# Patient Record
Sex: FEMALE | Race: BLACK OR AFRICAN AMERICAN | NOT HISPANIC OR LATINO | Employment: FULL TIME | URBAN - NONMETROPOLITAN AREA
[De-identification: names, ages, dates, MRNs, and addresses within clinical notes are randomized per-mention and may not be internally consistent; named-entity substitution may affect disease eponyms.]

---

## 2020-06-23 ENCOUNTER — HISTORICAL (OUTPATIENT)
Dept: ADMINISTRATIVE | Facility: HOSPITAL | Age: 40
End: 2020-06-23

## 2021-02-17 ENCOUNTER — TELEPHONE (OUTPATIENT)
Dept: OBSTETRICS AND GYNECOLOGY | Facility: CLINIC | Age: 41
End: 2021-02-17

## 2021-02-17 DIAGNOSIS — Z12.31 SCREENING MAMMOGRAM, ENCOUNTER FOR: Primary | ICD-10-CM

## 2021-02-19 ENCOUNTER — HOSPITAL ENCOUNTER (OUTPATIENT)
Dept: RADIOLOGY | Facility: HOSPITAL | Age: 41
Discharge: HOME | End: 2021-02-19
Attending: OBSTETRICS & GYNECOLOGY
Payer: MEDICAID

## 2021-02-19 VITALS — WEIGHT: 214 LBS | HEIGHT: 65 IN | BODY MASS INDEX: 35.65 KG/M2

## 2021-02-19 DIAGNOSIS — Z12.31 SCREENING MAMMOGRAM, ENCOUNTER FOR: ICD-10-CM

## 2021-02-19 PROCEDURE — 77067 SCR MAMMO BI INCL CAD: CPT | Mod: TC

## 2021-02-22 DIAGNOSIS — R92.8 ABNORMAL MAMMOGRAM: Primary | ICD-10-CM

## 2021-02-23 ENCOUNTER — HOSPITAL ENCOUNTER (OUTPATIENT)
Dept: RADIOLOGY | Facility: HOSPITAL | Age: 41
Discharge: HOME | End: 2021-02-23
Attending: OBSTETRICS & GYNECOLOGY
Payer: MEDICAID

## 2021-02-23 DIAGNOSIS — R92.8 ABNORMAL MAMMOGRAM: ICD-10-CM

## 2021-02-23 PROCEDURE — 76642 ULTRASOUND BREAST LIMITED: CPT | Mod: TC,RT

## 2021-02-23 PROCEDURE — 77065 DX MAMMO INCL CAD UNI: CPT | Mod: TC,RT

## 2021-06-23 ENCOUNTER — OFFICE VISIT (OUTPATIENT)
Dept: OBSTETRICS AND GYNECOLOGY | Facility: CLINIC | Age: 41
End: 2021-06-23
Payer: MEDICAID

## 2021-06-23 VITALS
DIASTOLIC BLOOD PRESSURE: 80 MMHG | HEIGHT: 65 IN | WEIGHT: 221 LBS | SYSTOLIC BLOOD PRESSURE: 136 MMHG | HEART RATE: 89 BPM | BODY MASS INDEX: 36.82 KG/M2

## 2021-06-23 DIAGNOSIS — Z12.4 SCREENING FOR MALIGNANT NEOPLASM OF THE CERVIX: Primary | ICD-10-CM

## 2021-06-23 DIAGNOSIS — Z01.419 ENCOUNTER FOR ANNUAL ROUTINE GYNECOLOGICAL EXAMINATION: ICD-10-CM

## 2021-06-23 PROCEDURE — 99386 PR PREVENTIVE VISIT,NEW,40-64: ICD-10-PCS | Mod: S$PBB,,, | Performed by: OBSTETRICS & GYNECOLOGY

## 2021-06-23 PROCEDURE — 99999 PR PBB SHADOW E&M-EST. PATIENT-LVL III: ICD-10-PCS | Mod: PBBFAC,,, | Performed by: OBSTETRICS & GYNECOLOGY

## 2021-06-23 PROCEDURE — 99213 OFFICE O/P EST LOW 20 MIN: CPT | Mod: PBBFAC | Performed by: OBSTETRICS & GYNECOLOGY

## 2021-06-23 PROCEDURE — 99999 PR PBB SHADOW E&M-EST. PATIENT-LVL III: CPT | Mod: PBBFAC,,, | Performed by: OBSTETRICS & GYNECOLOGY

## 2021-06-23 PROCEDURE — 99386 PREV VISIT NEW AGE 40-64: CPT | Mod: S$PBB,,, | Performed by: OBSTETRICS & GYNECOLOGY

## 2021-06-23 RX ORDER — CYCLOBENZAPRINE HCL 10 MG
10 TABLET ORAL 3 TIMES DAILY PRN
Status: ON HOLD | COMMUNITY
End: 2023-03-07 | Stop reason: HOSPADM

## 2021-07-06 ENCOUNTER — TELEPHONE (OUTPATIENT)
Dept: OBSTETRICS AND GYNECOLOGY | Facility: CLINIC | Age: 41
End: 2021-07-06

## 2021-07-13 LAB
HPV16+18+H RISK 12 DNA CVX-IMP: NORMAL
HPV16+18+H RISK 12 DNA CVX-IMP: NORMAL
LIQUID BASED PAP SMEAR, SCREENING: NORMAL
LIQUID BASED PAP SMEAR, SCREENING: NORMAL

## 2022-05-05 ENCOUNTER — TELEPHONE (OUTPATIENT)
Dept: OBSTETRICS AND GYNECOLOGY | Facility: CLINIC | Age: 42
End: 2022-05-05
Payer: MEDICAID

## 2022-07-07 ENCOUNTER — OFFICE VISIT (OUTPATIENT)
Dept: OBSTETRICS AND GYNECOLOGY | Facility: CLINIC | Age: 42
End: 2022-07-07
Payer: MEDICAID

## 2022-07-07 VITALS
BODY MASS INDEX: 36.99 KG/M2 | WEIGHT: 222 LBS | DIASTOLIC BLOOD PRESSURE: 76 MMHG | HEIGHT: 65 IN | HEART RATE: 89 BPM | SYSTOLIC BLOOD PRESSURE: 120 MMHG

## 2022-07-07 DIAGNOSIS — Z01.419 ENCOUNTER FOR ANNUAL ROUTINE GYNECOLOGICAL EXAMINATION: Primary | ICD-10-CM

## 2022-07-07 PROCEDURE — 3074F SYST BP LT 130 MM HG: CPT | Mod: CPTII,,, | Performed by: OBSTETRICS & GYNECOLOGY

## 2022-07-07 PROCEDURE — 99212 OFFICE O/P EST SF 10 MIN: CPT | Mod: PBBFAC | Performed by: OBSTETRICS & GYNECOLOGY

## 2022-07-07 PROCEDURE — 3078F DIAST BP <80 MM HG: CPT | Mod: CPTII,,, | Performed by: OBSTETRICS & GYNECOLOGY

## 2022-07-07 PROCEDURE — 99396 PR PREVENTIVE VISIT,EST,40-64: ICD-10-PCS | Mod: S$PBB,,, | Performed by: OBSTETRICS & GYNECOLOGY

## 2022-07-07 PROCEDURE — 99999 PR PBB SHADOW E&M-EST. PATIENT-LVL II: ICD-10-PCS | Mod: PBBFAC,,, | Performed by: OBSTETRICS & GYNECOLOGY

## 2022-07-07 PROCEDURE — 99396 PREV VISIT EST AGE 40-64: CPT | Mod: S$PBB,,, | Performed by: OBSTETRICS & GYNECOLOGY

## 2022-07-07 PROCEDURE — 3008F PR BODY MASS INDEX (BMI) DOCUMENTED: ICD-10-PCS | Mod: CPTII,,, | Performed by: OBSTETRICS & GYNECOLOGY

## 2022-07-07 PROCEDURE — 3078F PR MOST RECENT DIASTOLIC BLOOD PRESSURE < 80 MM HG: ICD-10-PCS | Mod: CPTII,,, | Performed by: OBSTETRICS & GYNECOLOGY

## 2022-07-07 PROCEDURE — 3008F BODY MASS INDEX DOCD: CPT | Mod: CPTII,,, | Performed by: OBSTETRICS & GYNECOLOGY

## 2022-07-07 PROCEDURE — 99999 PR PBB SHADOW E&M-EST. PATIENT-LVL II: CPT | Mod: PBBFAC,,, | Performed by: OBSTETRICS & GYNECOLOGY

## 2022-07-07 PROCEDURE — 3074F PR MOST RECENT SYSTOLIC BLOOD PRESSURE < 130 MM HG: ICD-10-PCS | Mod: CPTII,,, | Performed by: OBSTETRICS & GYNECOLOGY

## 2022-07-07 NOTE — PROGRESS NOTES
"SUBJECTIVE:   41 y.o. female for annual exam and checkup.  Current Outpatient Medications   Medication Sig Dispense Refill    cyclobenzaprine (FLEXERIL) 10 MG tablet Take 10 mg by mouth 3 (three) times daily as needed for Muscle spasms.       No current facility-administered medications for this visit.     Allergies: Patient has no known allergies.   Patient's last menstrual period was 06/29/2022.    ROS:  Feeling well. No dyspnea or chest pain on exertion.  No abdominal pain, change in bowel habits, black or bloody stools.  No urinary tract symptoms. GYN ROS: normal menses, no abnormal bleeding, pelvic pain or discharge, no breast pain or new or enlarging lumps on self exam. No neurological complaints.    OBJECTIVE:   The patient appears well, alert, oriented x 3, in no distress.  /76   Pulse 89   Ht 5' 5" (1.651 m)   Wt 100.7 kg (222 lb)   LMP 06/29/2022   BMI 36.94 kg/m²   ENT normal.  Neck supple. No adenopathy or thyromegaly. DIXIE. Lungs are clear, good air entry, no wheezes, rhonchi or rales. S1 and S2 normal, no murmurs, regular rate and rhythm. Abdomen soft without tenderness, guarding, mass or organomegaly. Extremities show no edema, normal peripheral pulses. Neurological is normal, no focal findings.    BREAST EXAM: breasts appear normal, no suspicious masses, no skin or nipple changes or axillary nodes    PELVIC EXAM: VULVA: normal appearing vulva with no masses, tenderness or lesions, VAGINA: normal appearing vagina with normal color and discharge, no lesions, CERVIX: normal appearing cervix without discharge or lesions, UTERUS: uterus is normal size, shape, consistency and nontender, ADNEXA: normal adnexa in size, nontender and no masses    ASSESSMENT:   well woman    PLAN:   counseled on breast self exam and adequate intake of calcium and vitamin D  return annually or prn    "

## 2022-12-27 ENCOUNTER — OFFICE VISIT (OUTPATIENT)
Dept: OBSTETRICS AND GYNECOLOGY | Facility: CLINIC | Age: 42
End: 2022-12-27
Payer: MEDICAID

## 2022-12-27 VITALS
BODY MASS INDEX: 38.82 KG/M2 | DIASTOLIC BLOOD PRESSURE: 82 MMHG | WEIGHT: 233 LBS | SYSTOLIC BLOOD PRESSURE: 132 MMHG | HEIGHT: 65 IN

## 2022-12-27 DIAGNOSIS — N92.1 MENORRHAGIA WITH IRREGULAR CYCLE: Primary | ICD-10-CM

## 2022-12-27 DIAGNOSIS — R23.2 HOT FLASHES: ICD-10-CM

## 2022-12-27 DIAGNOSIS — N94.6 DYSMENORRHEA: ICD-10-CM

## 2022-12-27 DIAGNOSIS — I10 PRIMARY HYPERTENSION: ICD-10-CM

## 2022-12-27 DIAGNOSIS — Z98.51 TUBAL LIGATION STATUS: ICD-10-CM

## 2022-12-27 PROCEDURE — 3079F DIAST BP 80-89 MM HG: CPT | Mod: CPTII,,, | Performed by: OBSTETRICS & GYNECOLOGY

## 2022-12-27 PROCEDURE — 3008F PR BODY MASS INDEX (BMI) DOCUMENTED: ICD-10-PCS | Mod: CPTII,,, | Performed by: OBSTETRICS & GYNECOLOGY

## 2022-12-27 PROCEDURE — 3008F BODY MASS INDEX DOCD: CPT | Mod: CPTII,,, | Performed by: OBSTETRICS & GYNECOLOGY

## 2022-12-27 PROCEDURE — 99213 OFFICE O/P EST LOW 20 MIN: CPT | Mod: PBBFAC | Performed by: OBSTETRICS & GYNECOLOGY

## 2022-12-27 PROCEDURE — 99999 PR PBB SHADOW E&M-EST. PATIENT-LVL III: ICD-10-PCS | Mod: PBBFAC,,, | Performed by: OBSTETRICS & GYNECOLOGY

## 2022-12-27 PROCEDURE — 99999 PR PBB SHADOW E&M-EST. PATIENT-LVL III: CPT | Mod: PBBFAC,,, | Performed by: OBSTETRICS & GYNECOLOGY

## 2022-12-27 PROCEDURE — 3075F SYST BP GE 130 - 139MM HG: CPT | Mod: CPTII,,, | Performed by: OBSTETRICS & GYNECOLOGY

## 2022-12-27 PROCEDURE — 3075F PR MOST RECENT SYSTOLIC BLOOD PRESS GE 130-139MM HG: ICD-10-PCS | Mod: CPTII,,, | Performed by: OBSTETRICS & GYNECOLOGY

## 2022-12-27 PROCEDURE — 3079F PR MOST RECENT DIASTOLIC BLOOD PRESSURE 80-89 MM HG: ICD-10-PCS | Mod: CPTII,,, | Performed by: OBSTETRICS & GYNECOLOGY

## 2022-12-27 PROCEDURE — 99213 PR OFFICE/OUTPT VISIT, EST, LEVL III, 20-29 MIN: ICD-10-PCS | Mod: S$PBB,,, | Performed by: OBSTETRICS & GYNECOLOGY

## 2022-12-27 PROCEDURE — 99213 OFFICE O/P EST LOW 20 MIN: CPT | Mod: S$PBB,,, | Performed by: OBSTETRICS & GYNECOLOGY

## 2022-12-27 RX ORDER — OMEPRAZOLE 20 MG/1
CAPSULE, DELAYED RELEASE ORAL
COMMUNITY
Start: 2022-11-09

## 2022-12-27 RX ORDER — HYDROCHLOROTHIAZIDE 25 MG/1
12.5 TABLET ORAL EVERY MORNING
COMMUNITY
Start: 2022-11-23

## 2022-12-27 RX ORDER — TRANEXAMIC ACID 650 MG/1
1300 TABLET ORAL 3 TIMES DAILY
Qty: 30 TABLET | Refills: 1 | Status: SHIPPED | OUTPATIENT
Start: 2022-12-27 | End: 2023-01-01

## 2022-12-27 RX ORDER — ONDANSETRON 4 MG/1
TABLET, ORALLY DISINTEGRATING ORAL
Status: ON HOLD | COMMUNITY
End: 2023-03-07 | Stop reason: HOSPADM

## 2022-12-27 NOTE — PROGRESS NOTES
Subjective:    Patient ID: Krzysztof Sosa is a 42 y.o. y.o. female    Chief Complaint:   Chief Complaint   Patient presents with    Female  Problem     Heavy/irregular menses       History of Present Illness:  Krzysztof presents today for evaluation of heavy cycles.    Has been on birth control pills in the past and it did not help with her heavy painful cycles.  She is passing clots and says that she had to be in the bed for most of Kae due to the pain and bleeding.  Discussed endometrial ablation and progesterone secreting IUD, but patient wants definitive management in the form of hysterectomy.  Patient also desires ovarian removal as she wants a complete hysterectomy.    Review of Systems   Constitutional:  Negative for chills, fatigue and fever.   Respiratory:  Negative for shortness of breath.    Cardiovascular:  Negative for chest pain.   Gastrointestinal:  Negative for abdominal pain, constipation, diarrhea and nausea.   Genitourinary:  Positive for dysmenorrhea, menorrhagia and menstrual problem. Negative for bladder incontinence, dysuria, hot flashes, pelvic pain and vaginal bleeding.   Neurological:  Negative for headaches.   Psychiatric/Behavioral:  Negative for depression.        Objective:    Vital Signs:  Vitals:    12/27/22 1056   BP: 132/82     Wt Readings from Last 1 Encounters:   12/27/22 105.7 kg (233 lb)     Body mass index is 38.77 kg/m².    Physical Exam:  General:  alert, no distress   Skin:  Skin color, texture, turgor normal. No rashes or lesions   Abdomen:  Soft, nontender   Extremities: No cyanosis, clubbing, edema        Discussed implications of ovarian removal and immediate menopause.  Patient states she already has menopause and has to sleep naked due to hot flashes.    I spent a total of 20 minutes on the day of the visit.  This includes face to face time and non-face to face time preparing to see the patient (eg, review of tests), obtaining and/or reviewing separately obtained  history, documenting clinical information in the electronic or other health record, independently interpreting results and communicating results to the patient/family/caregiver, or care coordinator.         Assessment:      1. Menorrhagia with irregular cycle    2. Primary hypertension    3. Tubal ligation status    4. Dysmenorrhea    5. Hot flashes          Plan:      Menorrhagia with irregular cycle  -     tranexamic acid (LYSTEDA) 650 mg tablet; Take 2 tablets (1,300 mg total) by mouth 3 (three) times daily. for 5 days  Dispense: 30 tablet; Refill: 1    Primary hypertension    Tubal ligation status    Dysmenorrhea  -     tranexamic acid (LYSTEDA) 650 mg tablet; Take 2 tablets (1,300 mg total) by mouth 3 (three) times daily. for 5 days  Dispense: 30 tablet; Refill: 1    Hot flashes    Return to clinic for ultrasound and endometrial biopsy.  Will schedule hysterectomy       Nimo Haddad MD, FACOG   12/27/2022 11:14 AM

## 2023-01-10 ENCOUNTER — OFFICE VISIT (OUTPATIENT)
Dept: OBSTETRICS AND GYNECOLOGY | Facility: CLINIC | Age: 43
End: 2023-01-10
Payer: MEDICAID

## 2023-01-10 ENCOUNTER — PROCEDURE VISIT (OUTPATIENT)
Dept: OBSTETRICS AND GYNECOLOGY | Facility: CLINIC | Age: 43
End: 2023-01-10
Payer: MEDICAID

## 2023-01-10 VITALS — BODY MASS INDEX: 37.61 KG/M2 | SYSTOLIC BLOOD PRESSURE: 122 MMHG | DIASTOLIC BLOOD PRESSURE: 86 MMHG | WEIGHT: 226 LBS

## 2023-01-10 DIAGNOSIS — N92.1 MENORRHAGIA WITH IRREGULAR CYCLE: ICD-10-CM

## 2023-01-10 DIAGNOSIS — N93.9 VAGINAL BLEEDING: Primary | ICD-10-CM

## 2023-01-10 DIAGNOSIS — I10 PRIMARY HYPERTENSION: ICD-10-CM

## 2023-01-10 DIAGNOSIS — Z32.02 NEGATIVE PREGNANCY TEST: Primary | ICD-10-CM

## 2023-01-10 DIAGNOSIS — Z98.51 TUBAL LIGATION STATUS: ICD-10-CM

## 2023-01-10 DIAGNOSIS — N94.6 DYSMENORRHEA: ICD-10-CM

## 2023-01-10 LAB
B-HCG UR QL: NEGATIVE
CTP QC/QA: YES

## 2023-01-10 PROCEDURE — 1159F PR MEDICATION LIST DOCUMENTED IN MEDICAL RECORD: ICD-10-PCS | Mod: CPTII,,, | Performed by: OBSTETRICS & GYNECOLOGY

## 2023-01-10 PROCEDURE — 1160F RVW MEDS BY RX/DR IN RCRD: CPT | Mod: CPTII,,, | Performed by: OBSTETRICS & GYNECOLOGY

## 2023-01-10 PROCEDURE — 58100 BIOPSY OF UTERUS LINING: CPT | Mod: PBBFAC | Performed by: OBSTETRICS & GYNECOLOGY

## 2023-01-10 PROCEDURE — 1159F MED LIST DOCD IN RCRD: CPT | Mod: CPTII,,, | Performed by: OBSTETRICS & GYNECOLOGY

## 2023-01-10 PROCEDURE — 3074F SYST BP LT 130 MM HG: CPT | Mod: CPTII,,, | Performed by: OBSTETRICS & GYNECOLOGY

## 2023-01-10 PROCEDURE — 3074F PR MOST RECENT SYSTOLIC BLOOD PRESSURE < 130 MM HG: ICD-10-PCS | Mod: CPTII,,, | Performed by: OBSTETRICS & GYNECOLOGY

## 2023-01-10 PROCEDURE — 58100 ENDOMETRIAL BIOPSY: ICD-10-PCS | Mod: S$PBB,,, | Performed by: OBSTETRICS & GYNECOLOGY

## 2023-01-10 PROCEDURE — 99999 PR PBB SHADOW E&M-EST. PATIENT-LVL III: CPT | Mod: PBBFAC,,, | Performed by: OBSTETRICS & GYNECOLOGY

## 2023-01-10 PROCEDURE — 81025 URINE PREGNANCY TEST: CPT | Mod: PBBFAC | Performed by: OBSTETRICS & GYNECOLOGY

## 2023-01-10 PROCEDURE — 99213 OFFICE O/P EST LOW 20 MIN: CPT | Mod: PBBFAC,25 | Performed by: OBSTETRICS & GYNECOLOGY

## 2023-01-10 PROCEDURE — 76856 US OB/GYN EXTENDED PROCEDURE (VIEWPOINT): ICD-10-PCS | Mod: 26,S$PBB,, | Performed by: OBSTETRICS & GYNECOLOGY

## 2023-01-10 PROCEDURE — 99499 NO LOS: ICD-10-PCS | Mod: S$PBB,,, | Performed by: OBSTETRICS & GYNECOLOGY

## 2023-01-10 PROCEDURE — 3079F PR MOST RECENT DIASTOLIC BLOOD PRESSURE 80-89 MM HG: ICD-10-PCS | Mod: CPTII,,, | Performed by: OBSTETRICS & GYNECOLOGY

## 2023-01-10 PROCEDURE — 1160F PR REVIEW ALL MEDS BY PRESCRIBER/CLIN PHARMACIST DOCUMENTED: ICD-10-PCS | Mod: CPTII,,, | Performed by: OBSTETRICS & GYNECOLOGY

## 2023-01-10 PROCEDURE — 3008F BODY MASS INDEX DOCD: CPT | Mod: CPTII,,, | Performed by: OBSTETRICS & GYNECOLOGY

## 2023-01-10 PROCEDURE — 3079F DIAST BP 80-89 MM HG: CPT | Mod: CPTII,,, | Performed by: OBSTETRICS & GYNECOLOGY

## 2023-01-10 PROCEDURE — 99499 UNLISTED E&M SERVICE: CPT | Mod: S$PBB,,, | Performed by: OBSTETRICS & GYNECOLOGY

## 2023-01-10 PROCEDURE — 99999 PR PBB SHADOW E&M-EST. PATIENT-LVL III: ICD-10-PCS | Mod: PBBFAC,,, | Performed by: OBSTETRICS & GYNECOLOGY

## 2023-01-10 PROCEDURE — 3008F PR BODY MASS INDEX (BMI) DOCUMENTED: ICD-10-PCS | Mod: CPTII,,, | Performed by: OBSTETRICS & GYNECOLOGY

## 2023-01-10 PROCEDURE — 76856 US EXAM PELVIC COMPLETE: CPT | Mod: 26,S$PBB,, | Performed by: OBSTETRICS & GYNECOLOGY

## 2023-01-10 NOTE — PROCEDURES
Endometrial biopsy    Date/Time: 1/10/2023 9:00 AM  Performed by: Nimo Haddad MD  Authorized by: Nimo Haddad MD     Consent:     Consent obtained:  Written    Consent given by:  Patient    Patient questions answered: yes      Patient agrees, verbalizes understanding, and wants to proceed: yes      Instructions and paperwork completed: yes    Indication:     Indications: Menorrhagia    Pre-procedure:     Pre-procedure timeout performed: yes    Procedure:     Procedure: endometrial biopsy with Pipelle      Cervix cleaned and prepped: yes (Betadine)      Uterus sounded: yes      Uterus sound depth (cm):  9    Specimen collected: specimen collected and sent to pathology      Patient tolerated procedure well with no complications: yes    Comments:     Procedure comments:  Ultrasound today shows endometrial stripe at 8.7 mm.  Patient still desires complete hysterectomy with BSO

## 2023-01-17 LAB — TISSUE SPECIMEN TO PATHOLOGY, OB/GYN: NORMAL

## 2023-01-20 NOTE — PROGRESS NOTES
Please call patient regarding results.  Biopsy wnl. Will proceed with surgery march 6. Needs medical clearance

## 2023-02-06 ENCOUNTER — TELEPHONE (OUTPATIENT)
Dept: OBSTETRICS AND GYNECOLOGY | Facility: CLINIC | Age: 43
End: 2023-02-06
Payer: MEDICAID

## 2023-02-22 ENCOUNTER — TELEPHONE (OUTPATIENT)
Dept: OBSTETRICS AND GYNECOLOGY | Facility: CLINIC | Age: 43
End: 2023-02-22
Payer: MEDICAID

## 2023-03-01 ENCOUNTER — HOSPITAL ENCOUNTER (OUTPATIENT)
Dept: PREADMISSION TESTING | Facility: HOSPITAL | Age: 43
Discharge: HOME | End: 2023-03-01
Attending: OBSTETRICS & GYNECOLOGY
Payer: MEDICAID

## 2023-03-01 ENCOUNTER — OFFICE VISIT (OUTPATIENT)
Dept: OBSTETRICS AND GYNECOLOGY | Facility: CLINIC | Age: 43
End: 2023-03-01
Payer: MEDICAID

## 2023-03-01 VITALS — WEIGHT: 216 LBS | HEIGHT: 65 IN | BODY MASS INDEX: 35.99 KG/M2

## 2023-03-01 VITALS — SYSTOLIC BLOOD PRESSURE: 122 MMHG | WEIGHT: 216 LBS | BODY MASS INDEX: 35.94 KG/M2 | DIASTOLIC BLOOD PRESSURE: 82 MMHG

## 2023-03-01 DIAGNOSIS — I10 PRIMARY HYPERTENSION: ICD-10-CM

## 2023-03-01 DIAGNOSIS — N92.1 MENORRHAGIA WITH IRREGULAR CYCLE: Primary | ICD-10-CM

## 2023-03-01 DIAGNOSIS — N94.6 DYSMENORRHEA: ICD-10-CM

## 2023-03-01 DIAGNOSIS — Z98.51 TUBAL LIGATION STATUS: ICD-10-CM

## 2023-03-01 PROCEDURE — 3079F PR MOST RECENT DIASTOLIC BLOOD PRESSURE 80-89 MM HG: ICD-10-PCS | Mod: CPTII,,, | Performed by: OBSTETRICS & GYNECOLOGY

## 2023-03-01 PROCEDURE — 3074F SYST BP LT 130 MM HG: CPT | Mod: CPTII,,, | Performed by: OBSTETRICS & GYNECOLOGY

## 2023-03-01 PROCEDURE — 3008F BODY MASS INDEX DOCD: CPT | Mod: CPTII,,, | Performed by: OBSTETRICS & GYNECOLOGY

## 2023-03-01 PROCEDURE — 1160F RVW MEDS BY RX/DR IN RCRD: CPT | Mod: CPTII,,, | Performed by: OBSTETRICS & GYNECOLOGY

## 2023-03-01 PROCEDURE — 99212 OFFICE O/P EST SF 10 MIN: CPT | Mod: PBBFAC | Performed by: OBSTETRICS & GYNECOLOGY

## 2023-03-01 PROCEDURE — 99999 PR PBB SHADOW E&M-EST. PATIENT-LVL II: ICD-10-PCS | Mod: PBBFAC,,, | Performed by: OBSTETRICS & GYNECOLOGY

## 2023-03-01 PROCEDURE — 99213 PR OFFICE/OUTPT VISIT, EST, LEVL III, 20-29 MIN: ICD-10-PCS | Mod: S$PBB,,, | Performed by: OBSTETRICS & GYNECOLOGY

## 2023-03-01 PROCEDURE — 1160F PR REVIEW ALL MEDS BY PRESCRIBER/CLIN PHARMACIST DOCUMENTED: ICD-10-PCS | Mod: CPTII,,, | Performed by: OBSTETRICS & GYNECOLOGY

## 2023-03-01 PROCEDURE — 1159F MED LIST DOCD IN RCRD: CPT | Mod: CPTII,,, | Performed by: OBSTETRICS & GYNECOLOGY

## 2023-03-01 PROCEDURE — 99213 OFFICE O/P EST LOW 20 MIN: CPT | Mod: S$PBB,,, | Performed by: OBSTETRICS & GYNECOLOGY

## 2023-03-01 PROCEDURE — 99999 PR PBB SHADOW E&M-EST. PATIENT-LVL II: CPT | Mod: PBBFAC,,, | Performed by: OBSTETRICS & GYNECOLOGY

## 2023-03-01 PROCEDURE — 1159F PR MEDICATION LIST DOCUMENTED IN MEDICAL RECORD: ICD-10-PCS | Mod: CPTII,,, | Performed by: OBSTETRICS & GYNECOLOGY

## 2023-03-01 PROCEDURE — 3079F DIAST BP 80-89 MM HG: CPT | Mod: CPTII,,, | Performed by: OBSTETRICS & GYNECOLOGY

## 2023-03-01 PROCEDURE — 3008F PR BODY MASS INDEX (BMI) DOCUMENTED: ICD-10-PCS | Mod: CPTII,,, | Performed by: OBSTETRICS & GYNECOLOGY

## 2023-03-01 PROCEDURE — 3074F PR MOST RECENT SYSTOLIC BLOOD PRESSURE < 130 MM HG: ICD-10-PCS | Mod: CPTII,,, | Performed by: OBSTETRICS & GYNECOLOGY

## 2023-03-01 RX ORDER — SODIUM CHLORIDE, SODIUM LACTATE, POTASSIUM CHLORIDE, CALCIUM CHLORIDE 600; 310; 30; 20 MG/100ML; MG/100ML; MG/100ML; MG/100ML
INJECTION, SOLUTION INTRAVENOUS CONTINUOUS
Status: CANCELLED | OUTPATIENT
Start: 2023-03-01

## 2023-03-01 RX ORDER — MUPIROCIN 20 MG/G
OINTMENT TOPICAL
Status: CANCELLED | OUTPATIENT
Start: 2023-03-01

## 2023-03-01 NOTE — H&P (VIEW-ONLY)
History & Physical    SUBJECTIVE:     History of Present Illness:  Patient is a 42 y.o. female presents for preop for TLH with BSO.  She is had a long history of heavy painful cycles for which oral contraceptives have not helped.  Other more conservative options have been discussed with her including but not limited to progesterone secreting IUD and endometrial ablation.  Patient wants definitive management in the form of hysterectomy.  She also wants ovarian removal whether they appear normal or not.  Endometrial biopsy is within normal limits    She was seen by primary care yesterday at [facility] in [place] and had some blood work done.  She states she has orders for chest x-ray and has to get another lab done for them.    Chief Complaint   Patient presents with    Pre-op Exam       Review of patient's allergies indicates:  No Known Allergies    Current Outpatient Medications   Medication Sig Dispense Refill    cyclobenzaprine (FLEXERIL) 10 MG tablet Take 10 mg by mouth 3 (three) times daily as needed for Muscle spasms.      hydroCHLOROthiazide (HYDRODIURIL) 25 MG tablet 1 capsule every morning.      omeprazole (PRILOSEC) 20 MG capsule 1 capsule 30 minutes before morning meal      ondansetron (ZOFRAN-ODT) 4 MG TbDL        No current facility-administered medications for this visit.       Past Medical History:   Diagnosis Date    Breast mass, right      Past Surgical History:   Procedure Laterality Date    BREAST CYST EXCISION Right     TUBAL LIGATION       Family History   Problem Relation Age of Onset    No Known Problems Mother     No Known Problems Father     No Known Problems Sister     No Known Problems Daughter     No Known Problems Maternal Aunt     No Known Problems Maternal Uncle     No Known Problems Paternal Aunt     No Known Problems Paternal Uncle     No Known Problems Maternal Grandmother     No Known Problems Maternal Grandfather     No Known Problems Paternal Grandmother     No Known  Problems Paternal Grandfather     Breast cancer Neg Hx     Ovarian cancer Neg Hx     BRCA 1/2 Neg Hx      Social History     Tobacco Use    Smoking status: Never   Substance Use Topics    Alcohol use: No     Alcohol/week: 0.0 standard drinks        Review of Systems:  Review of Systems   Constitutional:  Negative for chills and fever.   Respiratory:  Negative for cough.    Cardiovascular:  Negative for chest pain.   Gastrointestinal:  Negative for abdominal pain, constipation, diarrhea, nausea and vomiting.   Genitourinary:  Positive for menstrual problem. Negative for dysuria, pelvic pain and vaginal bleeding.   Musculoskeletal:  Positive for back pain.   Neurological:  Negative for dizziness.     OBJECTIVE:     Vital Signs (Most Recent)  BP: 122/82 (03/01/23 0930)     98 kg (216 lb)     Physical Exam:  Physical Exam  Constitutional:       Appearance: Normal appearance.   Cardiovascular:      Rate and Rhythm: Normal rate and regular rhythm.      Heart sounds: No murmur heard.  Pulmonary:      Effort: Pulmonary effort is normal.      Breath sounds: Normal breath sounds. No wheezing.   Abdominal:      General: Bowel sounds are normal. There is no distension.      Palpations: Abdomen is soft.   Genitourinary:     Vagina: Normal.      Cervix: Normal.      Uterus: Normal.       Adnexa: Right adnexa normal and left adnexa normal.   Musculoskeletal:         General: Normal range of motion.      Right lower leg: No edema.      Left lower leg: No edema.   Neurological:      Mental Status: She is alert.       Laboratory  Ordered    Diagnostic Results:  Ordered    Procedure explained including all risks, benefits, alternatives.  Patient understands that with ovarian removal she will begin menopausal symptoms immediately.  All questions answered and consent obtained.    ASSESSMENT/PLAN:     Menorrhagia with irregular cycle  Dysmenorrhea  Primary hypertension  Tubal ligation status    PLAN:Plan     TLH/BSO

## 2023-03-01 NOTE — PROGRESS NOTES
History & Physical    SUBJECTIVE:     History of Present Illness:  Patient is a 42 y.o. female presents for preop for TLH with BSO.  She is had a long history of heavy painful cycles for which oral contraceptives have not helped.  Other more conservative options have been discussed with her including but not limited to progesterone secreting IUD and endometrial ablation.  Patient wants definitive management in the form of hysterectomy.  She also wants ovarian removal whether they appear normal or not.  Endometrial biopsy is within normal limits    She was seen by primary care yesterday at [facility] in [place] and had some blood work done.  She states she has orders for chest x-ray and has to get another lab done for them.    Chief Complaint   Patient presents with    Pre-op Exam       Review of patient's allergies indicates:  No Known Allergies    Current Outpatient Medications   Medication Sig Dispense Refill    cyclobenzaprine (FLEXERIL) 10 MG tablet Take 10 mg by mouth 3 (three) times daily as needed for Muscle spasms.      hydroCHLOROthiazide (HYDRODIURIL) 25 MG tablet 1 capsule every morning.      omeprazole (PRILOSEC) 20 MG capsule 1 capsule 30 minutes before morning meal      ondansetron (ZOFRAN-ODT) 4 MG TbDL        No current facility-administered medications for this visit.       Past Medical History:   Diagnosis Date    Breast mass, right      Past Surgical History:   Procedure Laterality Date    BREAST CYST EXCISION Right     TUBAL LIGATION       Family History   Problem Relation Age of Onset    No Known Problems Mother     No Known Problems Father     No Known Problems Sister     No Known Problems Daughter     No Known Problems Maternal Aunt     No Known Problems Maternal Uncle     No Known Problems Paternal Aunt     No Known Problems Paternal Uncle     No Known Problems Maternal Grandmother     No Known Problems Maternal Grandfather     No Known Problems Paternal Grandmother     No Known  Problems Paternal Grandfather     Breast cancer Neg Hx     Ovarian cancer Neg Hx     BRCA 1/2 Neg Hx      Social History     Tobacco Use    Smoking status: Never   Substance Use Topics    Alcohol use: No     Alcohol/week: 0.0 standard drinks        Review of Systems:  Review of Systems   Constitutional:  Negative for chills and fever.   Respiratory:  Negative for cough.    Cardiovascular:  Negative for chest pain.   Gastrointestinal:  Negative for abdominal pain, constipation, diarrhea, nausea and vomiting.   Genitourinary:  Positive for menstrual problem. Negative for dysuria, pelvic pain and vaginal bleeding.   Musculoskeletal:  Positive for back pain.   Neurological:  Negative for dizziness.     OBJECTIVE:     Vital Signs (Most Recent)  BP: 122/82 (03/01/23 0930)     98 kg (216 lb)     Physical Exam:  Physical Exam  Constitutional:       Appearance: Normal appearance.   Cardiovascular:      Rate and Rhythm: Normal rate and regular rhythm.      Heart sounds: No murmur heard.  Pulmonary:      Effort: Pulmonary effort is normal.      Breath sounds: Normal breath sounds. No wheezing.   Abdominal:      General: Bowel sounds are normal. There is no distension.      Palpations: Abdomen is soft.   Genitourinary:     Vagina: Normal.      Cervix: Normal.      Uterus: Normal.       Adnexa: Right adnexa normal and left adnexa normal.   Musculoskeletal:         General: Normal range of motion.      Right lower leg: No edema.      Left lower leg: No edema.   Neurological:      Mental Status: She is alert.       Laboratory  Ordered    Diagnostic Results:  Ordered    Procedure explained including all risks, benefits, alternatives.  Patient understands that with ovarian removal she will begin menopausal symptoms immediately.  All questions answered and consent obtained.    ASSESSMENT/PLAN:     Menorrhagia with irregular cycle  Dysmenorrhea  Primary hypertension  Tubal ligation status    PLAN:Plan     TLH/BSO

## 2023-03-01 NOTE — DISCHARGE INSTRUCTIONS
BEFORE THE PROCEDURE:    REPORT ANY CHANGE IN YOUR PHYSICAL CONDITION TO YOUR DOCTOR IMMEDIATELY.  SELF ISOLATE AND CHECK TEMPERATURE DAILY, IF TEMP OVER 100, CALL PHYSICIAN IMMEDIATELY.  TRY TO REFRAIN FROM SMOKING AND ALCOHOL 72 HOURS BEFORE YOUR PROCEDURE.   DO NOT EAT OR DRINK ANYTHING AFTER MIDNIGHT THE NIGHT BEFORE YOUR PROCEDURE.  NO MAKE UP, NAIL POLISH OR JEWELRY.      SURGERY PREP INSTRUCTIONS    SOMEONE WILL CALL YOU THE DAY BEFORE YOUR PROCEDURE WITH A CHECK-IN TIME FOR YOUR PROCEDURE.    DAY OF YOUR PROCEDURE:    TAKE BLOOD PRESSURE MEDICATIONS THE MORNING OF YOUR PROCEDURE, WITH SMALL SIPS WATER, AS DIRECTED BY YOUR PHYSICIAN.   DO NOT TAKE ANY DIABETIC MEDICATIONS UNLESS DIRECTED TO DO SO BY YOUR PHYSICIAN.   CONTACT LENSES AND DENTURES MUST BE REMOVED.  A RESPONSIBLE ADULT MUST ACCOMPANY YOU HOME UPON DISCHARGE.   ONLY 1 VISITOR ALLOWED PER ROOM.       RETURN ON Friday MARCH 3, 2023 FOR LABS    YOUR THOUGHTS AND OPINIONS HELP US TO BETTER SERVE YOU.     PLEASE PARTICIPATE IN SURVEYS ABOUT YOUR CARE.    THANK YOU FOR CHOOSING [facility].

## 2023-03-01 NOTE — PRE-PROCEDURE INSTRUCTIONS
PATIENT STATES THAT SHE HAS AN APPOINTMENT WITH HER PCP DR. ANTHONY IN [place] ([phone])TODAY FOR LABS AND EKG.    DR. DENNIS OFFICE REQUEST AND MEDICAL CLEARANCE FROM PCP.

## 2023-03-02 ENCOUNTER — ANESTHESIA EVENT (OUTPATIENT)
Dept: SURGERY | Facility: HOSPITAL | Age: 43
End: 2023-03-02
Payer: MEDICAID

## 2023-03-02 NOTE — ANESTHESIA PREPROCEDURE EVALUATION
03/02/2023  Krzysztof Sosa is a 42 y.o., female.      Pre-op Assessment    I have reviewed the Patient Summary Reports.    I have reviewed the NPO Status.   I have reviewed the Medications.     Review of Systems  Anesthesia Hx:  No problems with previous Anesthesia  Denies Family Hx of Anesthesia complications.   Denies Personal Hx of Anesthesia complications.   Social:  Non-Smoker    Cardiovascular:   Hypertension, well controlled    Pulmonary:  Pulmonary Normal    Renal/:  Renal/ Normal     Hepatic/GI:  Hepatic/GI Normal    Neurological:  Neurology Normal    Hematocrit 39 hemoglobin 13 sodium 137 potassium 3.3 0.9  Lab Results   Component Value Date    WBC 12.10 03/02/2016    HGB 12.5 03/02/2016    HCT 37.8 03/02/2016    MCV 91 03/02/2016     03/02/2016     CMP  Sodium   Date Value Ref Range Status   03/02/2016 138 136 - 145 mmol/L Final     Potassium   Date Value Ref Range Status   03/02/2016 3.7 3.5 - 5.1 mmol/L Final     Chloride   Date Value Ref Range Status   03/02/2016 105 95 - 110 mmol/L Final     CO2   Date Value Ref Range Status   03/02/2016 27 23 - 29 mmol/L Final     Glucose   Date Value Ref Range Status   03/02/2016 90 70 - 110 mg/dL Final     BUN   Date Value Ref Range Status   03/02/2016 15 6 - 20 mg/dL Final     Creatinine   Date Value Ref Range Status   03/02/2016 0.8 0.5 - 1.4 mg/dL Final     Calcium   Date Value Ref Range Status   03/02/2016 9.0 8.7 - 10.5 mg/dL Final     Anion Gap   Date Value Ref Range Status   03/02/2016 6 (L) 8 - 16 mmol/L Final       Physical Exam  General: Well nourished    Airway:  Mallampati: III / II  Mouth Opening: Normal  TM Distance: Normal  Tongue: Normal  Neck ROM: Normal ROM    Dental:  Intact    Chest/Lungs:  Clear to auscultation    Heart:  Rate: Normal  Rhythm: Regular Rhythm  Sounds: Normal        Anesthesia Plan  Type of Anesthesia, risks &  benefits discussed:    Anesthesia Type: Gen ETT  Intra-op Monitoring Plan: Standard ASA Monitors  Post Op Pain Control Plan: multimodal analgesia  Induction:  IV  Airway Plan: Direct  Informed Consent: Informed consent signed with the Patient and all parties understand the risks and agree with anesthesia plan.  All questions answered.   ASA Score: 3  Day of Surgery Review of History & Physical: I have interviewed and examined the patient. I have reviewed the patient's H&P dated: There are no significant changes.     Ready For Surgery From Anesthesia Perspective.     .

## 2023-03-03 ENCOUNTER — HOSPITAL ENCOUNTER (OUTPATIENT)
Dept: PULMONOLOGY | Facility: HOSPITAL | Age: 43
Discharge: HOME | End: 2023-03-03
Attending: OBSTETRICS & GYNECOLOGY
Payer: MEDICAID

## 2023-03-03 DIAGNOSIS — N92.1 MENORRHAGIA WITH IRREGULAR CYCLE: ICD-10-CM

## 2023-03-03 PROCEDURE — 93010 EKG 12-LEAD: ICD-10-PCS | Mod: ,,, | Performed by: INTERNAL MEDICINE

## 2023-03-03 PROCEDURE — 93005 ELECTROCARDIOGRAM TRACING: CPT

## 2023-03-03 PROCEDURE — 93010 ELECTROCARDIOGRAM REPORT: CPT | Mod: ,,, | Performed by: INTERNAL MEDICINE

## 2023-03-03 NOTE — PRE-PROCEDURE INSTRUCTIONS
[facility] LABS AND MEDICAL CLEARANCE ON CHART DATED 03/03/2023 SIGNED BY RENEE OCAMPO DNP

## 2023-03-06 ENCOUNTER — ANESTHESIA (OUTPATIENT)
Dept: SURGERY | Facility: HOSPITAL | Age: 43
End: 2023-03-06
Payer: MEDICAID

## 2023-03-06 ENCOUNTER — HOSPITAL ENCOUNTER (OUTPATIENT)
Facility: HOSPITAL | Age: 43
Discharge: HOME | End: 2023-03-07
Attending: OBSTETRICS & GYNECOLOGY | Admitting: OBSTETRICS & GYNECOLOGY
Payer: MEDICAID

## 2023-03-06 DIAGNOSIS — N92.1 MENORRHAGIA WITH IRREGULAR CYCLE: ICD-10-CM

## 2023-03-06 PROBLEM — N94.6 DYSMENORRHEA: Status: ACTIVE | Noted: 2023-03-06

## 2023-03-06 PROBLEM — Z98.51 TUBAL LIGATION STATUS: Status: ACTIVE | Noted: 2023-03-06

## 2023-03-06 PROBLEM — I10 PRIMARY HYPERTENSION: Status: ACTIVE | Noted: 2023-03-06

## 2023-03-06 LAB — B-HCG UR QL: NEGATIVE

## 2023-03-06 PROCEDURE — C2628 CATHETER, OCCLUSION: HCPCS | Performed by: OBSTETRICS & GYNECOLOGY

## 2023-03-06 PROCEDURE — 94799 UNLISTED PULMONARY SVC/PX: CPT

## 2023-03-06 PROCEDURE — 71000033 HC RECOVERY, INTIAL HOUR: Performed by: OBSTETRICS & GYNECOLOGY

## 2023-03-06 PROCEDURE — 58571 PR LAPAROSCOPY W TOT HYSTERECTUTERUS <=250 GRAM  W TUBE/OVARY: ICD-10-PCS | Mod: ,,, | Performed by: OBSTETRICS & GYNECOLOGY

## 2023-03-06 PROCEDURE — 99900031 HC PATIENT EDUCATION (STAT)

## 2023-03-06 PROCEDURE — 25000003 PHARM REV CODE 250: Performed by: NURSE ANESTHETIST, CERTIFIED REGISTERED

## 2023-03-06 PROCEDURE — 99900035 HC TECH TIME PER 15 MIN (STAT)

## 2023-03-06 PROCEDURE — 63600175 PHARM REV CODE 636 W HCPCS: Performed by: OBSTETRICS & GYNECOLOGY

## 2023-03-06 PROCEDURE — 25000003 PHARM REV CODE 250: Performed by: OBSTETRICS & GYNECOLOGY

## 2023-03-06 PROCEDURE — 37000009 HC ANESTHESIA EA ADD 15 MINS: Performed by: OBSTETRICS & GYNECOLOGY

## 2023-03-06 PROCEDURE — 58571 TLH W/T/O 250 G OR LESS: CPT | Mod: ,,, | Performed by: OBSTETRICS & GYNECOLOGY

## 2023-03-06 PROCEDURE — 63600175 PHARM REV CODE 636 W HCPCS: Performed by: ANESTHESIOLOGY

## 2023-03-06 PROCEDURE — 81025 URINE PREGNANCY TEST: CPT | Performed by: OBSTETRICS & GYNECOLOGY

## 2023-03-06 PROCEDURE — 37000008 HC ANESTHESIA 1ST 15 MINUTES: Performed by: OBSTETRICS & GYNECOLOGY

## 2023-03-06 PROCEDURE — 36000711: Performed by: OBSTETRICS & GYNECOLOGY

## 2023-03-06 PROCEDURE — 27201423 OPTIME MED/SURG SUP & DEVICES STERILE SUPPLY: Performed by: OBSTETRICS & GYNECOLOGY

## 2023-03-06 PROCEDURE — 94761 N-INVAS EAR/PLS OXIMETRY MLT: CPT

## 2023-03-06 PROCEDURE — 00840 ANES IPER PX LOWER ABD NOS: CPT | Performed by: OBSTETRICS & GYNECOLOGY

## 2023-03-06 PROCEDURE — 63600175 PHARM REV CODE 636 W HCPCS: Performed by: NURSE ANESTHETIST, CERTIFIED REGISTERED

## 2023-03-06 PROCEDURE — 36000710: Performed by: OBSTETRICS & GYNECOLOGY

## 2023-03-06 RX ORDER — NEOSTIGMINE METHYLSULFATE 5 MG/5 ML
SYRINGE (ML) INTRAVENOUS
Status: DISCONTINUED | OUTPATIENT
Start: 2023-03-06 | End: 2023-03-06

## 2023-03-06 RX ORDER — FENTANYL CITRATE 50 UG/ML
INJECTION, SOLUTION INTRAMUSCULAR; INTRAVENOUS
Status: DISCONTINUED | OUTPATIENT
Start: 2023-03-06 | End: 2023-03-06

## 2023-03-06 RX ORDER — PROCHLORPERAZINE EDISYLATE 5 MG/ML
5 INJECTION INTRAMUSCULAR; INTRAVENOUS EVERY 6 HOURS PRN
Status: DISCONTINUED | OUTPATIENT
Start: 2023-03-06 | End: 2023-03-07 | Stop reason: HOSPADM

## 2023-03-06 RX ORDER — IBUPROFEN 600 MG/1
600 TABLET ORAL EVERY 6 HOURS
Status: DISCONTINUED | OUTPATIENT
Start: 2023-03-07 | End: 2023-03-07 | Stop reason: HOSPADM

## 2023-03-06 RX ORDER — DIPHENHYDRAMINE HCL 25 MG
25 CAPSULE ORAL EVERY 4 HOURS PRN
Status: DISCONTINUED | OUTPATIENT
Start: 2023-03-06 | End: 2023-03-07 | Stop reason: HOSPADM

## 2023-03-06 RX ORDER — MUPIROCIN 20 MG/G
OINTMENT TOPICAL 2 TIMES DAILY
Status: DISCONTINUED | OUTPATIENT
Start: 2023-03-06 | End: 2023-03-07 | Stop reason: HOSPADM

## 2023-03-06 RX ORDER — MORPHINE SULFATE 4 MG/ML
4 INJECTION, SOLUTION INTRAMUSCULAR; INTRAVENOUS EVERY 5 MIN PRN
Status: DISCONTINUED | OUTPATIENT
Start: 2023-03-06 | End: 2023-03-06 | Stop reason: HOSPADM

## 2023-03-06 RX ORDER — ONDANSETRON HYDROCHLORIDE 2 MG/ML
INJECTION, SOLUTION INTRAMUSCULAR; INTRAVENOUS
Status: DISCONTINUED | OUTPATIENT
Start: 2023-03-06 | End: 2023-03-06

## 2023-03-06 RX ORDER — DIPHENHYDRAMINE HYDROCHLORIDE 50 MG/ML
25 INJECTION INTRAMUSCULAR; INTRAVENOUS EVERY 6 HOURS PRN
Status: DISCONTINUED | OUTPATIENT
Start: 2023-03-06 | End: 2023-03-06 | Stop reason: HOSPADM

## 2023-03-06 RX ORDER — CEFAZOLIN SODIUM 2 G/50ML
2 SOLUTION INTRAVENOUS
Status: COMPLETED | OUTPATIENT
Start: 2023-03-06 | End: 2023-03-06

## 2023-03-06 RX ORDER — HYDROMORPHONE HYDROCHLORIDE 1 MG/ML
1 INJECTION, SOLUTION INTRAMUSCULAR; INTRAVENOUS; SUBCUTANEOUS EVERY 6 HOURS PRN
Status: DISCONTINUED | OUTPATIENT
Start: 2023-03-06 | End: 2023-03-07 | Stop reason: HOSPADM

## 2023-03-06 RX ORDER — MIDAZOLAM HYDROCHLORIDE 1 MG/ML
INJECTION INTRAMUSCULAR; INTRAVENOUS
Status: DISCONTINUED | OUTPATIENT
Start: 2023-03-06 | End: 2023-03-06

## 2023-03-06 RX ORDER — SODIUM CHLORIDE, SODIUM LACTATE, POTASSIUM CHLORIDE, CALCIUM CHLORIDE 600; 310; 30; 20 MG/100ML; MG/100ML; MG/100ML; MG/100ML
INJECTION, SOLUTION INTRAVENOUS CONTINUOUS
Status: DISCONTINUED | OUTPATIENT
Start: 2023-03-06 | End: 2023-03-06

## 2023-03-06 RX ORDER — HYDROMORPHONE HYDROCHLORIDE 2 MG/ML
INJECTION, SOLUTION INTRAMUSCULAR; INTRAVENOUS; SUBCUTANEOUS
Status: DISCONTINUED | OUTPATIENT
Start: 2023-03-06 | End: 2023-03-06

## 2023-03-06 RX ORDER — DEXAMETHASONE SODIUM PHOSPHATE 4 MG/ML
INJECTION, SOLUTION INTRA-ARTICULAR; INTRALESIONAL; INTRAMUSCULAR; INTRAVENOUS; SOFT TISSUE
Status: DISCONTINUED | OUTPATIENT
Start: 2023-03-06 | End: 2023-03-06

## 2023-03-06 RX ORDER — BISACODYL 10 MG
10 SUPPOSITORY, RECTAL RECTAL DAILY PRN
Status: DISCONTINUED | OUTPATIENT
Start: 2023-03-06 | End: 2023-03-07 | Stop reason: HOSPADM

## 2023-03-06 RX ORDER — ACETAMINOPHEN 10 MG/ML
INJECTION, SOLUTION INTRAVENOUS
Status: DISCONTINUED | OUTPATIENT
Start: 2023-03-06 | End: 2023-03-06

## 2023-03-06 RX ORDER — PROPOFOL 10 MG/ML
VIAL (ML) INTRAVENOUS
Status: DISCONTINUED | OUTPATIENT
Start: 2023-03-06 | End: 2023-03-06

## 2023-03-06 RX ORDER — OXYCODONE AND ACETAMINOPHEN 5; 325 MG/1; MG/1
1 TABLET ORAL EVERY 4 HOURS PRN
Status: DISCONTINUED | OUTPATIENT
Start: 2023-03-06 | End: 2023-03-07 | Stop reason: HOSPADM

## 2023-03-06 RX ORDER — OXYCODONE AND ACETAMINOPHEN 10; 325 MG/1; MG/1
1 TABLET ORAL EVERY 4 HOURS PRN
Status: DISCONTINUED | OUTPATIENT
Start: 2023-03-06 | End: 2023-03-07 | Stop reason: HOSPADM

## 2023-03-06 RX ORDER — ONDANSETRON 4 MG/1
8 TABLET, ORALLY DISINTEGRATING ORAL EVERY 8 HOURS PRN
Status: DISCONTINUED | OUTPATIENT
Start: 2023-03-06 | End: 2023-03-07 | Stop reason: HOSPADM

## 2023-03-06 RX ORDER — CEPHALEXIN 250 MG/1
500 TABLET ORAL 3 TIMES DAILY
Status: ON HOLD | COMMUNITY
End: 2023-03-07 | Stop reason: HOSPADM

## 2023-03-06 RX ORDER — MUPIROCIN 20 MG/G
OINTMENT TOPICAL
Status: DISCONTINUED | OUTPATIENT
Start: 2023-03-06 | End: 2023-03-06 | Stop reason: HOSPADM

## 2023-03-06 RX ORDER — ONDANSETRON 2 MG/ML
4 INJECTION INTRAMUSCULAR; INTRAVENOUS DAILY PRN
Status: DISCONTINUED | OUTPATIENT
Start: 2023-03-06 | End: 2023-03-06 | Stop reason: HOSPADM

## 2023-03-06 RX ORDER — HYDROCHLOROTHIAZIDE 25 MG/1
25 TABLET ORAL DAILY
Status: DISCONTINUED | OUTPATIENT
Start: 2023-03-06 | End: 2023-03-07 | Stop reason: HOSPADM

## 2023-03-06 RX ORDER — LIDOCAINE HYDROCHLORIDE 10 MG/ML
INJECTION, SOLUTION INTRAVENOUS
Status: DISCONTINUED | OUTPATIENT
Start: 2023-03-06 | End: 2023-03-06

## 2023-03-06 RX ORDER — ROCURONIUM BROMIDE 10 MG/ML
INJECTION, SOLUTION INTRAVENOUS
Status: DISCONTINUED | OUTPATIENT
Start: 2023-03-06 | End: 2023-03-06

## 2023-03-06 RX ORDER — PANTOPRAZOLE SODIUM 40 MG/1
40 TABLET, DELAYED RELEASE ORAL DAILY
Status: DISCONTINUED | OUTPATIENT
Start: 2023-03-07 | End: 2023-03-07 | Stop reason: HOSPADM

## 2023-03-06 RX ORDER — GLYCOPYRROLATE 0.2 MG/ML
INJECTION, SOLUTION INTRAMUSCULAR; INTRAVENOUS
Status: DISCONTINUED | OUTPATIENT
Start: 2023-03-06 | End: 2023-03-06

## 2023-03-06 RX ORDER — HYDROMORPHONE HYDROCHLORIDE 1 MG/ML
0.5 INJECTION, SOLUTION INTRAMUSCULAR; INTRAVENOUS; SUBCUTANEOUS EVERY 5 MIN PRN
Status: DISCONTINUED | OUTPATIENT
Start: 2023-03-06 | End: 2023-03-06 | Stop reason: HOSPADM

## 2023-03-06 RX ORDER — DIPHENHYDRAMINE HYDROCHLORIDE 50 MG/ML
25 INJECTION INTRAMUSCULAR; INTRAVENOUS EVERY 4 HOURS PRN
Status: DISCONTINUED | OUTPATIENT
Start: 2023-03-06 | End: 2023-03-07 | Stop reason: HOSPADM

## 2023-03-06 RX ORDER — SODIUM CHLORIDE 9 MG/ML
INJECTION, SOLUTION INTRAVENOUS CONTINUOUS
Status: DISCONTINUED | OUTPATIENT
Start: 2023-03-06 | End: 2023-03-07 | Stop reason: HOSPADM

## 2023-03-06 RX ADMIN — SODIUM CHLORIDE, SODIUM LACTATE, POTASSIUM CHLORIDE, AND CALCIUM CHLORIDE: 600; 310; 30; 20 INJECTION, SOLUTION INTRAVENOUS at 06:03

## 2023-03-06 RX ADMIN — PROCHLORPERAZINE EDISYLATE 5 MG: 5 INJECTION INTRAMUSCULAR; INTRAVENOUS at 02:03

## 2023-03-06 RX ADMIN — HYDROMORPHONE HYDROCHLORIDE 0.5 MG: 2 INJECTION, SOLUTION INTRAMUSCULAR; INTRAVENOUS; SUBCUTANEOUS at 11:03

## 2023-03-06 RX ADMIN — MORPHINE SULFATE 4 MG: 4 INJECTION, SOLUTION INTRAMUSCULAR; INTRAVENOUS at 11:03

## 2023-03-06 RX ADMIN — Medication 4 MG: at 11:03

## 2023-03-06 RX ADMIN — GLYCOPYRROLATE 0.6 MG: 0.2 INJECTION, SOLUTION INTRAMUSCULAR; INTRAVENOUS at 11:03

## 2023-03-06 RX ADMIN — OXYCODONE AND ACETAMINOPHEN 1 TABLET: 10; 325 TABLET ORAL at 02:03

## 2023-03-06 RX ADMIN — MORPHINE SULFATE 4 MG: 4 INJECTION, SOLUTION INTRAMUSCULAR; INTRAVENOUS at 12:03

## 2023-03-06 RX ADMIN — FENTANYL CITRATE 50 MCG: 50 INJECTION, SOLUTION INTRAMUSCULAR; INTRAVENOUS at 10:03

## 2023-03-06 RX ADMIN — SODIUM CHLORIDE, SODIUM LACTATE, POTASSIUM CHLORIDE, AND CALCIUM CHLORIDE: 600; 310; 30; 20 INJECTION, SOLUTION INTRAVENOUS at 10:03

## 2023-03-06 RX ADMIN — Medication 170 MG: at 09:03

## 2023-03-06 RX ADMIN — CEFAZOLIN SODIUM 2 G: 2 SOLUTION INTRAVENOUS at 08:03

## 2023-03-06 RX ADMIN — DEXAMETHASONE SODIUM PHOSPHATE 4 MG: 4 INJECTION, SOLUTION INTRA-ARTICULAR; INTRALESIONAL; INTRAMUSCULAR; INTRAVENOUS; SOFT TISSUE at 10:03

## 2023-03-06 RX ADMIN — MUPIROCIN: 20 OINTMENT TOPICAL at 01:03

## 2023-03-06 RX ADMIN — ONDANSETRON 4 MG: 2 INJECTION INTRAMUSCULAR; INTRAVENOUS at 11:03

## 2023-03-06 RX ADMIN — ROCURONIUM BROMIDE 40 MG: 10 INJECTION, SOLUTION INTRAVENOUS at 09:03

## 2023-03-06 RX ADMIN — FENTANYL CITRATE 150 MCG: 50 INJECTION, SOLUTION INTRAMUSCULAR; INTRAVENOUS at 09:03

## 2023-03-06 RX ADMIN — ACETAMINOPHEN 1000 MG: 10 INJECTION, SOLUTION INTRAVENOUS at 11:03

## 2023-03-06 RX ADMIN — LIDOCAINE HYDROCHLORIDE 50 MG: 10 INJECTION, SOLUTION INTRAVENOUS at 09:03

## 2023-03-06 RX ADMIN — MIDAZOLAM 2 MG: 1 INJECTION INTRAMUSCULAR; INTRAVENOUS at 09:03

## 2023-03-06 RX ADMIN — MUPIROCIN: 20 OINTMENT TOPICAL at 08:03

## 2023-03-06 NOTE — ANESTHESIA POSTPROCEDURE EVALUATION
Anesthesia Post Evaluation    Patient: Krzysztof Caesar    Procedure(s) Performed: Procedure(s) (LRB):  HYSTERECTOMY,TOTAL,LAPAROSCOPIC,WITH SALPINGO-OOPHORECTOMY (Bilateral)    Final Anesthesia Type: general      Patient location during evaluation: PACU  Patient participation: Yes- Able to Participate  Level of consciousness: awake  Post-procedure vital signs: reviewed and stable  Pain management: adequate  Airway patency: patent    PONV status at discharge: No PONV  Anesthetic complications: no      Cardiovascular status: blood pressure returned to baseline  Respiratory status: spontaneous ventilation  Hydration status: euvolemic  Follow-up not needed.          Vitals Value Taken Time   /68 03/06/23 1400   Temp 36.6 °C (97.9 °F) 03/06/23 1400   Pulse 65 03/06/23 1413   Resp 16 03/06/23 1409   SpO2 95 % 03/06/23 1413         Event Time   Out of Recovery 12:31:00         Pain/Javier Score: Pain Rating Prior to Med Admin: 8 (3/6/2023  2:09 PM)  Pain Rating Post Med Admin: 2 (3/6/2023 12:17 PM)  Javier Score: 10 (3/6/2023 12:25 PM)

## 2023-03-06 NOTE — ANESTHESIA PROCEDURE NOTES
Intubation    Date/Time: 3/6/2023 9:50 AM  Performed by: Sergey Way CRNA  Authorized by: Sergey Way CRNA     Intubation:     Induction:  Intravenous    Intubated:  Postinduction    Mask Ventilation:  Easy mask    Attempts:  1    Attempted By:  CRNA    Method of Intubation:  Direct    Blade:  Degroot 2    Laryngeal View Grade: Grade I - full view of cords      Difficult Airway Encountered?: No      Complications:  None    Airway Device:  Oral endotracheal tube    Airway Device Size:  7.0    Style/Cuff Inflation:  Cuffed    Inflation Amount (mL):  5    Tube secured:  20    Secured at:  The lips    Placement Verified By:  Capnometry    Complicating Factors:  None    Findings Post-Intubation:  BS equal bilateral and atraumatic/condition of teeth unchanged

## 2023-03-06 NOTE — TRANSFER OF CARE
Anesthesia Transfer of Care Note    Patient: Krzysztof Caesar    Procedure(s) Performed: Procedure(s) (LRB):  HYSTERECTOMY,TOTAL,LAPAROSCOPIC,WITH SALPINGO-OOPHORECTOMY (Bilateral)    Patient location: PACU    Anesthesia Type: general    Transport from OR: Transported from OR on room air with adequate spontaneous ventilation    Post pain: adequate analgesia    Post assessment: no apparent anesthetic complications    Post vital signs: stable    Level of consciousness: awake    Nausea/Vomiting: no nausea/vomiting    Complications: none    Transfer of care protocol was followed      Last vitals:   Visit Vitals  /66   Pulse 65   Temp 36.3 °C (97.3 °F) (Axillary)   Resp 18   LMP 02/03/2023 (Exact Date)   SpO2 95%   Breastfeeding No

## 2023-03-06 NOTE — PLAN OF CARE
Date of Service: 10/02/2022    PREOPERATIVE DIAGNOSIS:   Sacral decubitus.    POSTOPERATIVE DIAGNOSIS:   Grade 3 sacral decubitus.  Procedure debridement of grade 3 sacral decubitus down to muscle and fascia.  Total debridement is 8 x 8 cm.  SURGEON:   Reynold Lopez MD.    ASSISTANT(S):  None.    ANESTHESIA:   General endotracheal.    INDICATIONS:   This is a 55-year-old gentleman with a progressive sacral decubitus.  Risks and benefits of surgery were discussed with his POA including risk of bleeding, infection, scarring, and potential need for more surgery.  He understands and wants to proceed.    PROCEDURE:   The patient was taken to the operating suite, placed in the left lateral position.  The area was prepped and draped in sterile fashion.  Timeout was performed.  The sacral decubitus breakdown is 8 x 8 cm.  An incision was made with 15 blade.  Then, Bovie electrocautery was used to dissect through the dermis and subcutaneous tissue to remove all the necrotic dead tissue down to the fascia and muscular layer over the sacrum.  Top portion of the fascia was involved exposing the muscular layer over the coccyx region.  This was removed en bloc and sent to pathology.  The complete dissection is 8 x 8 cm and 4 cm deep.  The central portion of the muscle continued to have easy bleeding, so this needed to be oversewed with a running 2-0 silk suture and ligation.  The wound was irrigated with IrriSept and dressed with a 1-inch Iodoform gauze packing and sterile dressings.      Dictated By: Reynold Lopez MD  Signing Provider: MD JOHN Mustafa/kalia ([id number])   DD: 10/02/2022 1:04:02 PM TD: 10/02/2022 2:42:26 PM         Plan of care note reviewed with patient, patient and caregiver verbalize understanding and agree.

## 2023-03-06 NOTE — PLAN OF CARE
Problem: Adult Inpatient Plan of Care  Goal: Plan of Care Review  Outcome: Ongoing, Progressing     Problem: Adult Inpatient Plan of Care  Goal: Patient-Specific Goal (Individualized)  Outcome: Ongoing, Progressing     Problem: Adult Inpatient Plan of Care  Goal: Absence of Hospital-Acquired Illness or Injury  Outcome: Ongoing, Progressing     Problem: Adult Inpatient Plan of Care  Goal: Optimal Comfort and Wellbeing  Outcome: Ongoing, Progressing     Problem: Infection  Goal: Absence of Infection Signs and Symptoms  Outcome: Ongoing, Progressing     Problem: Bleeding (Hysterectomy)  Goal: Absence of Bleeding  Outcome: Ongoing, Progressing     Problem: Infection (Hysterectomy)  Goal: Absence of Infection Signs and Symptoms  Outcome: Ongoing, Progressing     Problem: Pain (Hysterectomy)  Goal: Acceptable Pain Control  Outcome: Ongoing, Progressing     Problem: Postoperative Nausea and Vomiting (Hysterectomy)  Goal: Nausea and Vomiting Relief  Outcome: Ongoing, Progressing

## 2023-03-06 NOTE — OP NOTE
Surgery Date: 3/6/2023     Surgeon(s) and Role:     * Keven Haddad MD - Primary    Pre-op Diagnosis:    Menorrhagia with irregular cycle [N92.1]  Dysmenorrhea [N94.6]    Post-op Diagnosis:    Menorrhagia with irregular cycle [N92.1]  Dysmenorrhea [N94.6]    Procedure(s):  Procedure(s):  HYSTERECTOMY,TOTAL,LAPAROSCOPIC,WITH SALPINGO-OOPHORECTOMY    Specimens (From admission, onward)       Start     Ordered    03/06/23 1058  Specimen to Pathology, Surgery Gynecology and Obstetrics  Once        Comments: Pre-op Diagnosis: Menorrhagia with irregular cycle [N92.1]Dysmenorrhea [N94.6]Procedure(s):HYSTERECTOMY,TOTAL,LAPAROSCOPIC,WITH SALPINGO-OOPHORECTOMY Number of specimens: 1Name of specimens: 1) UTERUS, CERVIX, BILATERAL FALLOPIAN TUBES, AND BILATERAL OVARIES @ 1055     References:    Click here for ordering Quick Tip   Question Answer Comment   Procedure Type: Gynecology and Obstetrics    Specimen Class: Complex case/Special    Which provider would you like to cc? KEVEN HADDAD    Release to patient Immediate        03/06/23 1121                    Anesthesia: General    EBL:100 mL  IV fluids: 1 liter  UOP: 500 mL      Procedure in Detail:  The patient was placed on the operating table in the dorsal supine position and given satisfactory general endotracheal anesthesia.  She was then placed in the lithotomy position. The abdomen was prepped with Chloroprep solution and the vagina was prepped with betadine solution. A Campos catheter was placed into the bladder for drainage.  She was then draped in the usual manner for laparoscopic procedure.  A sterile weighted speculum was placed into the vagina and the anterior lip of the cervix grasped with single-tooth tenaculum.  The uterus was gently sounded to 9 cm and the cervix dilated to allow a ESTEFANI uterine manipulator to be placed.  Stay sutures at the 12 and 6 o'clock position were placed using 0 Vicryl.  The ESTEFANI uterine manipulator and KOH colpotomizer were assembled  and inserted without difficulty.  The vaginal occluder was filled with saline.    Attention was turned to the abdomen where a 1 cm infraumbilical skin incision was then made. A Veress needle was introduced into the abdomen without difficulty.  Intraperitoneal placement was verified with the saline drop test.  The abdomen was inflated with CO2 to a pressure of 15mm Hg.  The Veress needle was removed and a 10 mm Optiview port was then placed in the umbilicus without difficulty using the laparoscope as guidance for entering the abdomen. Following this a 5mm port was also placed in the right and left lower quadrants under direct visualization.    The pelvis was inspected. The uterus appeared nonenlarged.  The ovaries appeared normal bilaterally although hyperemic. The pelvic peritoneum appeared Normal. The upper abdomen was inspected and appeared Normal.    At this time, with the assistant elevating the fallopian tube and ovary, the left infundibulopelvic  ligament was transected with the LigaSure with good hemostasis.  The left side of the bladder flap was then created using the LigaSure to incise the anterior peritoneum of the broad ligament.  The uterine vessels were skeletonized on the left side, and ligated and divided with the LigaSure without difficulty.  Attention was then turned to the patient's right side.  In a similar fashion the LigaSure was used to divide the round ligament, infundibulopelvic ligament and complete the bladder flap. The uterine vessels were then skeletonized, ligated and divided with the LigaSure.      The LigaSure was used to dissect the bladder from the lower uterine segment and cervix.  Once this was found to be well dissected attention was turned posteriorly.  A posterior colpotomy incision was made with the harmonic scalpel.  The incision was extended laterally to the level of the uterine arteries.  Similarly, an anterior colpotomy incision was made with the harmonic scalpel.  This  incision was also extended laterally to the level of the uterine arteries.  The uterine arteries were ligated and divided with the LigaSure and the anterior and posterior colpotomy incisions were connected, creating a circumferential incision.    The uterus, cervix, bilateral tubes, and bilateral ovaries were removed from the pelvis via the vaginal canal.  A laparotomy sponge wrapped in a glove was placed into the vaginal canal to maintain pneumoperitoneum.  The operative field was irrigated with the suction  and cleared of all clots and debris.  An area on the vaginal cuff was made hemostatic with the LigaSure.  The vaginal cuff was then closed with a 0 Vicryl V lock suture in a running fashion.  Excellent hemostasis was noted.    At this time, with all pedicles hemostatic, the abdomen was deflated of CO2  and again inspected for bleeding. With no bleeding noted, the ports were removed and the skin incisions were closed with 4-0 Monocryl subcuticular stitch.       The patient tolerated the procedure well and left the operating room awake, alert, and with vital signs stable.      Estimated blood loss was approximately 100cc.

## 2023-03-06 NOTE — INTERVAL H&P NOTE
The patient has been examined and the H&P has been reviewed:    I concur with the findings and no changes have occurred since H&P was written.    Surgery risks, benefits and alternative options discussed and understood by patient/family.          Active Hospital Problems    Diagnosis  POA    *Menorrhagia with irregular cycle [N92.1]  Yes    Dysmenorrhea [N94.6]  Yes    Tubal ligation status [Z98.51]  Not Applicable    Primary hypertension [I10]  Yes      Resolved Hospital Problems   No resolved problems to display.

## 2023-03-07 VITALS
TEMPERATURE: 98 F | HEART RATE: 73 BPM | SYSTOLIC BLOOD PRESSURE: 105 MMHG | WEIGHT: 216 LBS | DIASTOLIC BLOOD PRESSURE: 69 MMHG | OXYGEN SATURATION: 97 % | HEIGHT: 65 IN | RESPIRATION RATE: 17 BRPM | BODY MASS INDEX: 35.99 KG/M2

## 2023-03-07 PROBLEM — E87.6 HYPOKALEMIA: Status: ACTIVE | Noted: 2023-03-07

## 2023-03-07 LAB
ANION GAP SERPL CALC-SCNC: 4 MMOL/L (ref 8–16)
BASOPHILS # BLD AUTO: 0.03 K/UL (ref 0–0.2)
BASOPHILS NFR BLD: 0.2 % (ref 0–1.9)
BUN SERPL-MCNC: 10 MG/DL (ref 6–20)
CALCIUM SERPL-MCNC: 8.4 MG/DL (ref 8.7–10.5)
CHLORIDE SERPL-SCNC: 100 MMOL/L (ref 95–110)
CO2 SERPL-SCNC: 34 MMOL/L (ref 23–29)
CREAT SERPL-MCNC: 1 MG/DL (ref 0.5–1.4)
DIFFERENTIAL METHOD: ABNORMAL
EOSINOPHIL # BLD AUTO: 0 K/UL (ref 0–0.5)
EOSINOPHIL NFR BLD: 0.1 % (ref 0–8)
ERYTHROCYTE [DISTWIDTH] IN BLOOD BY AUTOMATED COUNT: 12.1 % (ref 11.5–14.5)
EST. GFR  (NO RACE VARIABLE): >60 ML/MIN/1.73 M^2
GLUCOSE SERPL-MCNC: 147 MG/DL (ref 70–110)
HCT VFR BLD AUTO: 34.5 % (ref 37–48.5)
HGB BLD-MCNC: 10.8 G/DL (ref 12–16)
IMM GRANULOCYTES # BLD AUTO: 0.04 K/UL (ref 0–0.04)
IMM GRANULOCYTES NFR BLD AUTO: 0.3 % (ref 0–0.5)
LYMPHOCYTES # BLD AUTO: 3.3 K/UL (ref 1–4.8)
LYMPHOCYTES NFR BLD: 24.7 % (ref 18–48)
MCH RBC QN AUTO: 28 PG (ref 27–31)
MCHC RBC AUTO-ENTMCNC: 31.3 G/DL (ref 32–36)
MCV RBC AUTO: 89 FL (ref 82–98)
MONOCYTES # BLD AUTO: 0.6 K/UL (ref 0.3–1)
MONOCYTES NFR BLD: 4.7 % (ref 4–15)
NEUTROPHILS # BLD AUTO: 9.5 K/UL (ref 1.8–7.7)
NEUTROPHILS NFR BLD: 70 % (ref 38–73)
NRBC BLD-RTO: 0 /100 WBC
PLATELET # BLD AUTO: 363 K/UL (ref 150–450)
PMV BLD AUTO: 10.9 FL (ref 9.2–12.9)
POTASSIUM SERPL-SCNC: 2.8 MMOL/L (ref 3.5–5.1)
RBC # BLD AUTO: 3.86 M/UL (ref 4–5.4)
SODIUM SERPL-SCNC: 138 MMOL/L (ref 136–145)
WBC # BLD AUTO: 13.51 K/UL (ref 3.9–12.7)

## 2023-03-07 PROCEDURE — 36415 COLL VENOUS BLD VENIPUNCTURE: CPT | Performed by: OBSTETRICS & GYNECOLOGY

## 2023-03-07 PROCEDURE — 99900035 HC TECH TIME PER 15 MIN (STAT)

## 2023-03-07 PROCEDURE — 85025 COMPLETE CBC W/AUTO DIFF WBC: CPT | Performed by: OBSTETRICS & GYNECOLOGY

## 2023-03-07 PROCEDURE — 25000003 PHARM REV CODE 250: Performed by: OBSTETRICS & GYNECOLOGY

## 2023-03-07 PROCEDURE — 94799 UNLISTED PULMONARY SVC/PX: CPT

## 2023-03-07 PROCEDURE — 99900031 HC PATIENT EDUCATION (STAT)

## 2023-03-07 PROCEDURE — 94761 N-INVAS EAR/PLS OXIMETRY MLT: CPT

## 2023-03-07 PROCEDURE — 80048 BASIC METABOLIC PNL TOTAL CA: CPT | Performed by: OBSTETRICS & GYNECOLOGY

## 2023-03-07 RX ORDER — POTASSIUM CHLORIDE 20 MEQ/1
20 TABLET, EXTENDED RELEASE ORAL 2 TIMES DAILY
Status: DISCONTINUED | OUTPATIENT
Start: 2023-03-07 | End: 2023-03-07 | Stop reason: HOSPADM

## 2023-03-07 RX ORDER — POTASSIUM CHLORIDE 750 MG/1
20 TABLET, EXTENDED RELEASE ORAL DAILY
Qty: 10 TABLET | Refills: 0 | Status: SHIPPED | OUTPATIENT
Start: 2023-03-07 | End: 2023-04-18

## 2023-03-07 RX ORDER — OXYCODONE AND ACETAMINOPHEN 5; 325 MG/1; MG/1
1 TABLET ORAL EVERY 6 HOURS PRN
Qty: 20 TABLET | Refills: 0 | Status: SHIPPED | OUTPATIENT
Start: 2023-03-07 | End: 2023-04-18

## 2023-03-07 RX ORDER — IBUPROFEN 600 MG/1
600 TABLET ORAL 3 TIMES DAILY
Qty: 40 TABLET | Refills: 1 | Status: SHIPPED | OUTPATIENT
Start: 2023-03-07

## 2023-03-07 RX ORDER — DOCUSATE SODIUM 100 MG/1
100 CAPSULE, LIQUID FILLED ORAL 2 TIMES DAILY
Qty: 60 CAPSULE | Refills: 1 | Status: SHIPPED | OUTPATIENT
Start: 2023-03-07 | End: 2024-03-06

## 2023-03-07 RX ADMIN — POTASSIUM CHLORIDE 20 MEQ: 1500 TABLET, EXTENDED RELEASE ORAL at 09:03

## 2023-03-07 RX ADMIN — OXYCODONE AND ACETAMINOPHEN 1 TABLET: 10; 325 TABLET ORAL at 09:03

## 2023-03-07 RX ADMIN — IBUPROFEN 600 MG: 600 TABLET ORAL at 01:03

## 2023-03-07 RX ADMIN — PANTOPRAZOLE SODIUM 40 MG: 40 TABLET, DELAYED RELEASE ORAL at 09:03

## 2023-03-07 RX ADMIN — OXYCODONE AND ACETAMINOPHEN 1 TABLET: 10; 325 TABLET ORAL at 02:03

## 2023-03-07 NOTE — NURSING
Pt remained stable throughout shift. Campos catheter still in place. PRN pain medication given once. Pt rested after PRN pain medication. Will continue to monitor pt vitals and pain control. Will proceed with POC.

## 2023-03-07 NOTE — NURSING
Patient off unit for discharge via wheelchair in stable condition with RN. Accompanied by mother.     electronic

## 2023-03-07 NOTE — NURSING
Pt stated she has passed gas. Pt was able to eat breakfast without complaining of nausea. Requested pain medication and medication given. Informed Pt to call RN Pt needed anything.

## 2023-03-07 NOTE — PROGRESS NOTES
Progress Note    1 Day Post-Op   HYSTERECTOMY,TOTAL,LAPAROSCOPIC,WITH SALPINGO-OOPHORECTOMY  AR LAPAROSCOPY TOT HYSTERECTOMY UTERUS >250 GRAM W TUBE/OVARY [03701]     Patient Active Problem List   Diagnosis    Breast mass, right    Menorrhagia with irregular cycle    Dysmenorrhea    Tubal ligation status    Primary hypertension       Krzysztof reports that she is feeling well postoperatively. She is tolerating feedings. She denies nausea, vomiting. She is not passing flatus.  She reports that pain is well controlled with pain meds. Campos has been discontinued, but she has not voided.    Vitals:    23 0730   BP: (!) 99/57   Pulse: 65   Resp: 18   Temp: 98 °F (36.7 °C)         Temp Range:  Temp (24hrs), Av °F (36.7 °C), Min:97.3 °F (36.3 °C), Max:98.2 °F (36.8 °C)       Exam:    Lungs: clear to auscultation, no wheezes, rales or rhonchi, symmetric air entry    Cardiac: normal sinus rhythm    Abd: soft, bowel sounds active, non-tender    Incision: Healing well. No erythema, significant drainage.  Suture intact.    Ext:  No cyanosis, clubbing, edema           Recent labs:     WBC   Date Value Ref Range Status   2023 13.51 (H) 3.90 - 12.70 K/uL Final     Hemoglobin   Date Value Ref Range Status   2023 10.8 (L) 12.0 - 16.0 g/dL Final     Hematocrit   Date Value Ref Range Status   2023 34.5 (L) 37.0 - 48.5 % Final         I/O last 3 completed shifts:  In: 1400 [I.V.:1300; IV Piggyback:100]  Out: 1950 [Urine:1850; Blood:100]  No intake/output data recorded.    BMP: K 2.8 (preop 3.3)      Assessment:  Postop day 1 status post TLH with BSO  Hypertension   Hypokalemia     Plan: Activity: ad luis-encourage ambulation and await flatus   Diet: General/Regular   Medications: begin:  Potassium supplementation       Nimo Haddad MD, FACOG

## 2023-03-07 NOTE — NURSING
Discharge instructions provided, all questions answered. Patient verbalized understanding. IV discontinued. Instructed to call RN  for wheelchair when ready. Pt currently waiting for family member for ride.

## 2023-03-07 NOTE — DISCHARGE INSTRUCTIONS
No heavy lifting more than 10 lbs or strenuous exercise for 6 weeks    No tub bathing -take SHOWERS until released by physician    Please  medications and continue to take your medications as prescribed    Remove top dressing on 3/8/2023 but leave steri-strips in place    Keep incision clean and dry    No driving until released from MD.     Follow up with Dr. Haddad in 2 weeks for recheck, call [phone] to schedule your appointment    Follow up in ER should any emergencies develop such as severe chest pain, severe abdominal pain, fever of 101.4 not relieved with medication, increase in vaginal bleeding, foul drainage from incision site.         Thank you for choosing [facility]!

## 2023-03-07 NOTE — PLAN OF CARE
Problem: Adult Inpatient Plan of Care  Goal: Plan of Care Review  Outcome: Met  Goal: Patient-Specific Goal (Individualized)  Outcome: Met  Goal: Absence of Hospital-Acquired Illness or Injury  Outcome: Met  Goal: Optimal Comfort and Wellbeing  Outcome: Met  Goal: Readiness for Transition of Care  Outcome: Met     Problem: Infection  Goal: Absence of Infection Signs and Symptoms  Outcome: Met     Problem: Bleeding (Hysterectomy)  Goal: Absence of Bleeding  Outcome: Met     Problem: Bowel Motility Impaired (Hysterectomy)  Goal: Effective Bowel Elimination  Outcome: Met     Problem: Fluid and Electrolyte Imbalance (Hysterectomy)  Goal: Fluid and Electrolyte Balance  Outcome: Met     Problem: Infection (Hysterectomy)  Goal: Absence of Infection Signs and Symptoms  Outcome: Met     Problem: Ongoing Anesthesia Effects (Hysterectomy)  Goal: Anesthesia/Sedation Recovery  Outcome: Met     Problem: Pain (Hysterectomy)  Goal: Acceptable Pain Control  Outcome: Met     Problem: Postoperative Nausea and Vomiting (Hysterectomy)  Goal: Nausea and Vomiting Relief  Outcome: Met     Problem: Postoperative Urinary Retention (Hysterectomy)  Goal: Effective Urinary Elimination  Outcome: Met     Problem: Respiratory Compromise (Hysterectomy)  Goal: Effective Oxygenation and Ventilation  Outcome: Met

## 2023-03-07 NOTE — DISCHARGE SUMMARY
[facility] - OB/GYN  Discharge Note      Procedure(s) (LRB):  HYSTERECTOMY,TOTAL,LAPAROSCOPIC,WITH SALPINGO-OOPHORECTOMY (Bilateral)      OUTCOME: Patient tolerated treatment/procedure well without complication and is now ready for discharge.    DISPOSITION: Home or Self Care    FINAL DIAGNOSIS:  Menorrhagia with irregular cycle    FOLLOWUP: In clinic    DISCHARGE INSTRUCTIONS:    Discharge Procedure Orders   CBC Auto Differential   Standing Status: Future Number of Occurrences: 1 Standing Exp. Date: 04/29/24     Diet diabetic     Lifting restrictions     Pelvic Rest     No driving until:   Order Comments: 2 weeks     Notify your health care provider if you experience any of the following:  temperature >100.4     Notify your health care provider if you experience any of the following:  severe uncontrolled pain     Notify your health care provider if you experience any of the following:  redness, tenderness, or signs of infection (pain, swelling, redness, odor or green/yellow discharge around incision site)     Notify your health care provider if you experience any of the following:  difficulty breathing or increased cough     Notify your health care provider if you experience any of the following:  severe persistent headache     Remove dressing in 48 hours     EKG 12-lead   Standing Status: Future Number of Occurrences: 1 Standing Exp. Date: 03/01/24     Type And Screen Preop   Standing Status: Future Number of Occurrences: 1 Standing Exp. Date: 04/29/24         Clinical Reference Documents Added to Patient Instructions         Document    HYPOKALEMIA DISCHARGE INSTRUCTIONS (ENGLISH)    HYSTERECTOMY DISCHARGE INSTRUCTIONS (ENGLISH)            TIME SPENT ON DISCHARGE: 35 minutes

## 2023-03-07 NOTE — PLAN OF CARE
Patient rested well this shift, post-surgical pain controlled with medication, funk catheter removed per protocol, instructed to call for assistance up to the bathroom, vaginal bleeding scant, bed low, siderails up X2, call bell in reach, Pt verbalized understanding.

## 2023-03-07 NOTE — NURSING
Resting comfortably without distress, denies complaints or needs at this time, will continue to monitor.

## 2023-03-10 LAB — SPECIMEN TO PATHOLOGY - SURGICAL: NORMAL

## 2023-03-20 ENCOUNTER — TELEPHONE (OUTPATIENT)
Dept: INFECTIOUS DISEASES | Facility: CLINIC | Age: 43
End: 2023-03-20
Payer: MEDICAID

## 2023-03-20 NOTE — TELEPHONE ENCOUNTER
Patient labs drawn by  for HCW exposure.    Home test Friday and Saturday for HIV both negative.  Hep BsAg neg. HepC neg.  HIV 1/2 Ag/Ab reactive.    HIV 1 neg  HIV 2Neg  HIV supplemental assay neg  HIV rapid test Non Reactive.    Suspect Ag/Ab false positive.  Sent PCR to confirm.  Counseled pt.  Waiting on results.    Discussed with Dr. Haddad last week.

## 2023-03-21 ENCOUNTER — OFFICE VISIT (OUTPATIENT)
Dept: OBSTETRICS AND GYNECOLOGY | Facility: CLINIC | Age: 43
End: 2023-03-21
Payer: MEDICAID

## 2023-03-21 ENCOUNTER — TELEPHONE (OUTPATIENT)
Dept: INFECTIOUS DISEASES | Facility: CLINIC | Age: 43
End: 2023-03-21
Payer: MEDICAID

## 2023-03-21 VITALS — DIASTOLIC BLOOD PRESSURE: 88 MMHG | BODY MASS INDEX: 35.78 KG/M2 | SYSTOLIC BLOOD PRESSURE: 146 MMHG | WEIGHT: 215 LBS

## 2023-03-21 DIAGNOSIS — Z90.710 S/P HYSTERECTOMY: ICD-10-CM

## 2023-03-21 DIAGNOSIS — I10 PRIMARY HYPERTENSION: ICD-10-CM

## 2023-03-21 DIAGNOSIS — Z09 POSTOP CHECK: Primary | ICD-10-CM

## 2023-03-21 PROCEDURE — 99213 OFFICE O/P EST LOW 20 MIN: CPT | Mod: PBBFAC | Performed by: OBSTETRICS & GYNECOLOGY

## 2023-03-21 PROCEDURE — 1159F MED LIST DOCD IN RCRD: CPT | Mod: CPTII,,, | Performed by: OBSTETRICS & GYNECOLOGY

## 2023-03-21 PROCEDURE — 99999 PR PBB SHADOW E&M-EST. PATIENT-LVL III: ICD-10-PCS | Mod: PBBFAC,,, | Performed by: OBSTETRICS & GYNECOLOGY

## 2023-03-21 PROCEDURE — 3079F DIAST BP 80-89 MM HG: CPT | Mod: CPTII,,, | Performed by: OBSTETRICS & GYNECOLOGY

## 2023-03-21 PROCEDURE — 99999 PR PBB SHADOW E&M-EST. PATIENT-LVL III: CPT | Mod: PBBFAC,,, | Performed by: OBSTETRICS & GYNECOLOGY

## 2023-03-21 PROCEDURE — 3008F BODY MASS INDEX DOCD: CPT | Mod: CPTII,,, | Performed by: OBSTETRICS & GYNECOLOGY

## 2023-03-21 PROCEDURE — 3008F PR BODY MASS INDEX (BMI) DOCUMENTED: ICD-10-PCS | Mod: CPTII,,, | Performed by: OBSTETRICS & GYNECOLOGY

## 2023-03-21 PROCEDURE — 99024 PR POST-OP FOLLOW-UP VISIT: ICD-10-PCS | Mod: ,,, | Performed by: OBSTETRICS & GYNECOLOGY

## 2023-03-21 PROCEDURE — 1160F PR REVIEW ALL MEDS BY PRESCRIBER/CLIN PHARMACIST DOCUMENTED: ICD-10-PCS | Mod: CPTII,,, | Performed by: OBSTETRICS & GYNECOLOGY

## 2023-03-21 PROCEDURE — 3077F SYST BP >= 140 MM HG: CPT | Mod: CPTII,,, | Performed by: OBSTETRICS & GYNECOLOGY

## 2023-03-21 PROCEDURE — 1159F PR MEDICATION LIST DOCUMENTED IN MEDICAL RECORD: ICD-10-PCS | Mod: CPTII,,, | Performed by: OBSTETRICS & GYNECOLOGY

## 2023-03-21 PROCEDURE — 99024 POSTOP FOLLOW-UP VISIT: CPT | Mod: ,,, | Performed by: OBSTETRICS & GYNECOLOGY

## 2023-03-21 PROCEDURE — 1160F RVW MEDS BY RX/DR IN RCRD: CPT | Mod: CPTII,,, | Performed by: OBSTETRICS & GYNECOLOGY

## 2023-03-21 PROCEDURE — 3077F PR MOST RECENT SYSTOLIC BLOOD PRESSURE >= 140 MM HG: ICD-10-PCS | Mod: CPTII,,, | Performed by: OBSTETRICS & GYNECOLOGY

## 2023-03-21 PROCEDURE — 3079F PR MOST RECENT DIASTOLIC BLOOD PRESSURE 80-89 MM HG: ICD-10-PCS | Mod: CPTII,,, | Performed by: OBSTETRICS & GYNECOLOGY

## 2023-03-21 NOTE — PROGRESS NOTES
Subjective:    Patient ID: Krzysztof Sosa is a 42 y.o. y.o. female    Chief Complaint:   Chief Complaint   Patient presents with    Post-op Evaluation       History of Present Illness:  Krzysztof presents today for follow up of TLH/BSO 2 weeks ago.  Patient states she is feeling well.  No issues with pain or significant bleeding.  States has occasional streaks of pink/red when she wipes      Review of Systems   Constitutional:  Negative for chills, fatigue and fever.   Gastrointestinal:  Negative for abdominal pain.   Genitourinary:  Negative for pelvic pain and vaginal bleeding.       Objective:    Vital Signs:  Vitals:    03/21/23 1004   BP: (!) 146/88     Wt Readings from Last 1 Encounters:   03/21/23 97.5 kg (215 lb)     Body mass index is 35.78 kg/m².    Physical Exam:  General:  alert, no distress   Skin:  Skin color, texture, turgor normal. No rashes or lesions   Abdomen:  Soft, nontender.  Incision clean, dry, intact   Extremities: No cyanosis, clubbing, edema         Activities and limitations reiterated.  Explained she may drive at this time.  All questions answered.          Assessment:      1. Postop check    2. S/P hysterectomy    3. Primary hypertension          Plan:      Postop check    S/P hysterectomy    Primary hypertension      Return to clinic 4 weeks for postop visit       Nimo Haddad MD, FACOG   03/21/2023 10:49 AM

## 2023-04-18 ENCOUNTER — OFFICE VISIT (OUTPATIENT)
Dept: OBSTETRICS AND GYNECOLOGY | Facility: CLINIC | Age: 43
End: 2023-04-18
Payer: MEDICAID

## 2023-04-18 VITALS
BODY MASS INDEX: 36.32 KG/M2 | DIASTOLIC BLOOD PRESSURE: 93 MMHG | SYSTOLIC BLOOD PRESSURE: 137 MMHG | WEIGHT: 218 LBS | HEIGHT: 65 IN | HEART RATE: 85 BPM

## 2023-04-18 DIAGNOSIS — Z09 POSTOP CHECK: Primary | ICD-10-CM

## 2023-04-18 DIAGNOSIS — I10 PRIMARY HYPERTENSION: ICD-10-CM

## 2023-04-18 DIAGNOSIS — Z90.710 S/P HYSTERECTOMY: ICD-10-CM

## 2023-04-18 PROCEDURE — 3075F PR MOST RECENT SYSTOLIC BLOOD PRESS GE 130-139MM HG: ICD-10-PCS | Mod: CPTII,,, | Performed by: OBSTETRICS & GYNECOLOGY

## 2023-04-18 PROCEDURE — 3080F DIAST BP >= 90 MM HG: CPT | Mod: CPTII,,, | Performed by: OBSTETRICS & GYNECOLOGY

## 2023-04-18 PROCEDURE — 3075F SYST BP GE 130 - 139MM HG: CPT | Mod: CPTII,,, | Performed by: OBSTETRICS & GYNECOLOGY

## 2023-04-18 PROCEDURE — 3080F PR MOST RECENT DIASTOLIC BLOOD PRESSURE >= 90 MM HG: ICD-10-PCS | Mod: CPTII,,, | Performed by: OBSTETRICS & GYNECOLOGY

## 2023-04-18 PROCEDURE — 3008F BODY MASS INDEX DOCD: CPT | Mod: CPTII,,, | Performed by: OBSTETRICS & GYNECOLOGY

## 2023-04-18 PROCEDURE — 99024 POSTOP FOLLOW-UP VISIT: CPT | Mod: ,,, | Performed by: OBSTETRICS & GYNECOLOGY

## 2023-04-18 PROCEDURE — 3008F PR BODY MASS INDEX (BMI) DOCUMENTED: ICD-10-PCS | Mod: CPTII,,, | Performed by: OBSTETRICS & GYNECOLOGY

## 2023-04-18 PROCEDURE — 1160F RVW MEDS BY RX/DR IN RCRD: CPT | Mod: CPTII,,, | Performed by: OBSTETRICS & GYNECOLOGY

## 2023-04-18 PROCEDURE — 99999 PR PBB SHADOW E&M-EST. PATIENT-LVL III: CPT | Mod: PBBFAC,,, | Performed by: OBSTETRICS & GYNECOLOGY

## 2023-04-18 PROCEDURE — 1159F PR MEDICATION LIST DOCUMENTED IN MEDICAL RECORD: ICD-10-PCS | Mod: CPTII,,, | Performed by: OBSTETRICS & GYNECOLOGY

## 2023-04-18 PROCEDURE — 1160F PR REVIEW ALL MEDS BY PRESCRIBER/CLIN PHARMACIST DOCUMENTED: ICD-10-PCS | Mod: CPTII,,, | Performed by: OBSTETRICS & GYNECOLOGY

## 2023-04-18 PROCEDURE — 99999 PR PBB SHADOW E&M-EST. PATIENT-LVL III: ICD-10-PCS | Mod: PBBFAC,,, | Performed by: OBSTETRICS & GYNECOLOGY

## 2023-04-18 PROCEDURE — 99213 OFFICE O/P EST LOW 20 MIN: CPT | Mod: PBBFAC | Performed by: OBSTETRICS & GYNECOLOGY

## 2023-04-18 PROCEDURE — 1159F MED LIST DOCD IN RCRD: CPT | Mod: CPTII,,, | Performed by: OBSTETRICS & GYNECOLOGY

## 2023-04-18 PROCEDURE — 99024 PR POST-OP FOLLOW-UP VISIT: ICD-10-PCS | Mod: ,,, | Performed by: OBSTETRICS & GYNECOLOGY

## 2023-04-18 NOTE — PROGRESS NOTES
Subjective:    Patient ID: Krzysztof Sosa is a 42 y.o. y.o. female    Chief Complaint:   Chief Complaint   Patient presents with    Post-op Evaluation     6 wk post op h no c/o       History of Present Illness:  Krzysztof presents today for follow up of [facility] 6 weeks ago.  Patient states she is feeling well and has no complaints.  No major discharge or bleeding noted.      Review of Systems   Constitutional:  Negative for chills, fatigue and fever.   Respiratory:  Negative for shortness of breath.    Cardiovascular:  Negative for chest pain.   Gastrointestinal:  Negative for abdominal pain, constipation, diarrhea and nausea.   Genitourinary:  Negative for bladder incontinence, dysuria, hot flashes, pelvic pain and vaginal bleeding.   Neurological:  Negative for headaches.   Psychiatric/Behavioral:  Negative for depression.        Objective:    Vital Signs:  Vitals:    04/18/23 0923   BP: (!) 137/93   Pulse: 85     Wt Readings from Last 1 Encounters:   04/18/23 98.9 kg (218 lb)     Body mass index is 36.28 kg/m².    Physical Exam:  General:  alert, no distress   Abdomen:   Soft, nontender.  Incisions clean, dry, intact-healing well   Pelvis: External genitalia: normal general appearance  Urinary system: urethral meatus normal, bladder nontender  Vaginal: normal mucosa without prolapse or lesions, cuff intact and healing well.  Cervix: removed surgically  Uterus: removed surgically  Adnexa: normal bimanual exam     Discussed resumption of normal activities.  Recommended taking it slow with sexual intercourse.  All questions answered          Assessment:      1. Postop check    2. S/P hysterectomy    3. Primary hypertension          Plan:      Postop check    S/P hysterectomy    Primary hypertension    Return to clinic for annual exam       Nimo Haddad MD, FACOG   04/18/2023 9:49 AM

## 2023-04-24 ENCOUNTER — TELEPHONE (OUTPATIENT)
Dept: OBSTETRICS AND GYNECOLOGY | Facility: CLINIC | Age: 43
End: 2023-04-24
Payer: MEDICAID

## 2023-04-24 DIAGNOSIS — N95.1 MENOPAUSAL SYMPTOMS: Primary | ICD-10-CM

## 2023-04-24 RX ORDER — ESTRADIOL 1 MG/1
1 TABLET ORAL DAILY
Qty: 30 TABLET | Refills: 11 | Status: SHIPPED | OUTPATIENT
Start: 2023-04-24 | End: 2023-08-29

## 2023-04-24 NOTE — TELEPHONE ENCOUNTER
"Pt states she is having symptoms of "horrible hot flashes" asking if something can be sent in to the pharmacy      [facility]  "

## 2023-05-04 ENCOUNTER — OFFICE VISIT (OUTPATIENT)
Dept: OBSTETRICS AND GYNECOLOGY | Facility: CLINIC | Age: 43
End: 2023-05-04
Payer: MEDICAID

## 2023-05-04 VITALS
SYSTOLIC BLOOD PRESSURE: 140 MMHG | BODY MASS INDEX: 36.65 KG/M2 | WEIGHT: 220 LBS | HEIGHT: 65 IN | DIASTOLIC BLOOD PRESSURE: 91 MMHG | HEART RATE: 87 BPM

## 2023-05-04 DIAGNOSIS — R10.2 PELVIC PAIN: Primary | ICD-10-CM

## 2023-05-04 DIAGNOSIS — I10 PRIMARY HYPERTENSION: ICD-10-CM

## 2023-05-04 DIAGNOSIS — Z90.710 S/P HYSTERECTOMY: ICD-10-CM

## 2023-05-04 PROCEDURE — 1159F MED LIST DOCD IN RCRD: CPT | Mod: CPTII,,, | Performed by: OBSTETRICS & GYNECOLOGY

## 2023-05-04 PROCEDURE — 3077F SYST BP >= 140 MM HG: CPT | Mod: CPTII,,, | Performed by: OBSTETRICS & GYNECOLOGY

## 2023-05-04 PROCEDURE — 99999 PR PBB SHADOW E&M-EST. PATIENT-LVL III: ICD-10-PCS | Mod: PBBFAC,,, | Performed by: OBSTETRICS & GYNECOLOGY

## 2023-05-04 PROCEDURE — 3077F PR MOST RECENT SYSTOLIC BLOOD PRESSURE >= 140 MM HG: ICD-10-PCS | Mod: CPTII,,, | Performed by: OBSTETRICS & GYNECOLOGY

## 2023-05-04 PROCEDURE — 99213 OFFICE O/P EST LOW 20 MIN: CPT | Mod: PBBFAC | Performed by: OBSTETRICS & GYNECOLOGY

## 2023-05-04 PROCEDURE — 1159F PR MEDICATION LIST DOCUMENTED IN MEDICAL RECORD: ICD-10-PCS | Mod: CPTII,,, | Performed by: OBSTETRICS & GYNECOLOGY

## 2023-05-04 PROCEDURE — 3080F DIAST BP >= 90 MM HG: CPT | Mod: CPTII,,, | Performed by: OBSTETRICS & GYNECOLOGY

## 2023-05-04 PROCEDURE — 1160F PR REVIEW ALL MEDS BY PRESCRIBER/CLIN PHARMACIST DOCUMENTED: ICD-10-PCS | Mod: CPTII,,, | Performed by: OBSTETRICS & GYNECOLOGY

## 2023-05-04 PROCEDURE — 99999 PR PBB SHADOW E&M-EST. PATIENT-LVL III: CPT | Mod: PBBFAC,,, | Performed by: OBSTETRICS & GYNECOLOGY

## 2023-05-04 PROCEDURE — 3008F BODY MASS INDEX DOCD: CPT | Mod: CPTII,,, | Performed by: OBSTETRICS & GYNECOLOGY

## 2023-05-04 PROCEDURE — 99213 PR OFFICE/OUTPT VISIT, EST, LEVL III, 20-29 MIN: ICD-10-PCS | Mod: 24,S$PBB,, | Performed by: OBSTETRICS & GYNECOLOGY

## 2023-05-04 PROCEDURE — 3008F PR BODY MASS INDEX (BMI) DOCUMENTED: ICD-10-PCS | Mod: CPTII,,, | Performed by: OBSTETRICS & GYNECOLOGY

## 2023-05-04 PROCEDURE — 99213 OFFICE O/P EST LOW 20 MIN: CPT | Mod: 24,S$PBB,, | Performed by: OBSTETRICS & GYNECOLOGY

## 2023-05-04 PROCEDURE — 3080F PR MOST RECENT DIASTOLIC BLOOD PRESSURE >= 90 MM HG: ICD-10-PCS | Mod: CPTII,,, | Performed by: OBSTETRICS & GYNECOLOGY

## 2023-05-04 PROCEDURE — 1160F RVW MEDS BY RX/DR IN RCRD: CPT | Mod: CPTII,,, | Performed by: OBSTETRICS & GYNECOLOGY

## 2023-05-04 RX ORDER — NAPROXEN 500 MG/1
500 TABLET ORAL 2 TIMES DAILY
Qty: 60 TABLET | Refills: 1 | Status: SHIPPED | OUTPATIENT
Start: 2023-05-04

## 2023-05-04 NOTE — PROGRESS NOTES
Subjective:    Patient ID: Krzysztof Sosa is a 42 y.o. y.o. female    Chief Complaint:   Chief Complaint   Patient presents with    Pelvic Pain     States sharp pain started this past weekend also c/o pain in naval area @ incision site.        History of Present Illness:  Krzysztof presents today for evaluation of pelvic pain.  Had hysterectomy approximately 2 months ago and has recently gone back to work.    Overdoing it at work and thinks that she needs to rest.  She has not had a day off since she has been back to work after surgery.  She reports that she is there from open to close and there was some heavy lifting involved      Review of Systems   Constitutional:  Positive for fatigue. Negative for chills and fever.   Respiratory:  Negative for shortness of breath.    Cardiovascular:  Negative for chest pain.   Gastrointestinal:  Positive for abdominal pain. Negative for constipation, diarrhea and nausea.   Genitourinary:  Positive for pelvic pain. Negative for bladder incontinence, dysuria, hot flashes and vaginal bleeding.   Neurological:  Negative for headaches.   Psychiatric/Behavioral:  Negative for depression.        Objective:    Vital Signs:  Vitals:    05/04/23 1536   BP: (!) 140/91   Pulse: 87     Wt Readings from Last 1 Encounters:   05/04/23 99.8 kg (220 lb)     Body mass index is 36.61 kg/m².    Physical Exam:  General:  alert, no distress   Skin:  Skin color, texture, turgor normal. No rashes or lesions   Abdomen:  Soft, nontender.  Incisions healing well   Extremities: No cyanosis, clubbing, edema           Discussed that these symptoms are a normal part of healing.  It has only been a few months since surgery.  Recommended she do not overdo it with very strenuous activities.  All questions answered    I spent a total of 20 minutes on the day of the visit.  This includes face to face time and non-face to face time preparing to see the patient (eg, review of tests), obtaining and/or reviewing  separately obtained history, documenting clinical information in the electronic or other health record, independently interpreting results and communicating results to the patient/family/caregiver, or care coordinator.         Assessment:      1. Pelvic pain    2. S/P hysterectomy    3. Primary hypertension          Plan:      Pelvic pain    S/P hysterectomy    Primary hypertension    Other orders  -     naproxen (NAPROSYN) 500 MG tablet; Take 1 tablet (500 mg total) by mouth 2 (two) times daily.  Dispense: 60 tablet; Refill: 1    Work excuse given       Nimo Haddad MD, FACOG   05/04/2023 4:13 PM

## 2023-05-17 ENCOUNTER — TELEPHONE (OUTPATIENT)
Dept: OBSTETRICS AND GYNECOLOGY | Facility: CLINIC | Age: 43
End: 2023-05-17
Payer: MEDICAID

## 2023-05-22 ENCOUNTER — TELEPHONE (OUTPATIENT)
Dept: OBSTETRICS AND GYNECOLOGY | Facility: CLINIC | Age: 43
End: 2023-05-22
Payer: MEDICAID

## 2023-08-10 ENCOUNTER — OFFICE VISIT (OUTPATIENT)
Dept: OBSTETRICS AND GYNECOLOGY | Facility: CLINIC | Age: 43
End: 2023-08-10
Payer: MEDICAID

## 2023-08-10 VITALS
DIASTOLIC BLOOD PRESSURE: 84 MMHG | BODY MASS INDEX: 38.65 KG/M2 | HEART RATE: 81 BPM | WEIGHT: 232 LBS | HEIGHT: 65 IN | SYSTOLIC BLOOD PRESSURE: 156 MMHG

## 2023-08-10 DIAGNOSIS — I10 PRIMARY HYPERTENSION: ICD-10-CM

## 2023-08-10 DIAGNOSIS — Z12.31 SCREENING MAMMOGRAM, ENCOUNTER FOR: ICD-10-CM

## 2023-08-10 DIAGNOSIS — Z90.710 HISTORY OF HYSTERECTOMY: ICD-10-CM

## 2023-08-10 DIAGNOSIS — Z01.419 ENCOUNTER FOR ANNUAL ROUTINE GYNECOLOGICAL EXAMINATION: Primary | ICD-10-CM

## 2023-08-10 PROCEDURE — 99396 PR PREVENTIVE VISIT,EST,40-64: ICD-10-PCS | Mod: S$PBB,,, | Performed by: OBSTETRICS & GYNECOLOGY

## 2023-08-10 PROCEDURE — 3077F PR MOST RECENT SYSTOLIC BLOOD PRESSURE >= 140 MM HG: ICD-10-PCS | Mod: CPTII,,, | Performed by: OBSTETRICS & GYNECOLOGY

## 2023-08-10 PROCEDURE — 1159F PR MEDICATION LIST DOCUMENTED IN MEDICAL RECORD: ICD-10-PCS | Mod: CPTII,,, | Performed by: OBSTETRICS & GYNECOLOGY

## 2023-08-10 PROCEDURE — 3008F BODY MASS INDEX DOCD: CPT | Mod: CPTII,,, | Performed by: OBSTETRICS & GYNECOLOGY

## 2023-08-10 PROCEDURE — 3079F PR MOST RECENT DIASTOLIC BLOOD PRESSURE 80-89 MM HG: ICD-10-PCS | Mod: CPTII,,, | Performed by: OBSTETRICS & GYNECOLOGY

## 2023-08-10 PROCEDURE — 1160F RVW MEDS BY RX/DR IN RCRD: CPT | Mod: CPTII,,, | Performed by: OBSTETRICS & GYNECOLOGY

## 2023-08-10 PROCEDURE — 3077F SYST BP >= 140 MM HG: CPT | Mod: CPTII,,, | Performed by: OBSTETRICS & GYNECOLOGY

## 2023-08-10 PROCEDURE — 99999 PR PBB SHADOW E&M-EST. PATIENT-LVL III: CPT | Mod: PBBFAC,,, | Performed by: OBSTETRICS & GYNECOLOGY

## 2023-08-10 PROCEDURE — 99396 PREV VISIT EST AGE 40-64: CPT | Mod: S$PBB,,, | Performed by: OBSTETRICS & GYNECOLOGY

## 2023-08-10 PROCEDURE — 1159F MED LIST DOCD IN RCRD: CPT | Mod: CPTII,,, | Performed by: OBSTETRICS & GYNECOLOGY

## 2023-08-10 PROCEDURE — 99213 OFFICE O/P EST LOW 20 MIN: CPT | Mod: PBBFAC | Performed by: OBSTETRICS & GYNECOLOGY

## 2023-08-10 PROCEDURE — 3079F DIAST BP 80-89 MM HG: CPT | Mod: CPTII,,, | Performed by: OBSTETRICS & GYNECOLOGY

## 2023-08-10 PROCEDURE — 99999 PR PBB SHADOW E&M-EST. PATIENT-LVL III: ICD-10-PCS | Mod: PBBFAC,,, | Performed by: OBSTETRICS & GYNECOLOGY

## 2023-08-10 PROCEDURE — 1160F PR REVIEW ALL MEDS BY PRESCRIBER/CLIN PHARMACIST DOCUMENTED: ICD-10-PCS | Mod: CPTII,,, | Performed by: OBSTETRICS & GYNECOLOGY

## 2023-08-10 PROCEDURE — 3008F PR BODY MASS INDEX (BMI) DOCUMENTED: ICD-10-PCS | Mod: CPTII,,, | Performed by: OBSTETRICS & GYNECOLOGY

## 2023-08-10 NOTE — PROGRESS NOTES
"SUBJECTIVE:   42 y.o. female for annual routine Pap and checkup.  Current Outpatient Medications   Medication Sig Dispense Refill    estradioL (ESTRACE) 1 MG tablet Take 1 tablet (1 mg total) by mouth once daily. 30 tablet 11    hydroCHLOROthiazide (HYDRODIURIL) 25 MG tablet 12.5 mg every morning.      ibuprofen (ADVIL,MOTRIN) 600 MG tablet Take 1 tablet (600 mg total) by mouth 3 (three) times daily. 40 tablet 1    omeprazole (PRILOSEC) 20 MG capsule 1 capsule 30 minutes before morning meal      docusate sodium (COLACE) 100 MG capsule Take 1 capsule (100 mg total) by mouth 2 (two) times daily. (Patient not taking: Reported on 4/18/2023) 60 capsule 1    naproxen (NAPROSYN) 500 MG tablet Take 1 tablet (500 mg total) by mouth 2 (two) times daily. (Patient not taking: Reported on 8/10/2023) 60 tablet 1     No current facility-administered medications for this visit.     Allergies: Patient has no known allergies.   Patient's last menstrual period was 02/06/2023 (exact date).    ROS:  Feeling well. No dyspnea or chest pain on exertion.  No abdominal pain, change in bowel habits, black or bloody stools.  No urinary tract symptoms. GYN ROS: no breast pain or new or enlarging lumps on self exam, no vaginal bleeding, no discharge or pelvic pain. No neurological complaints.    OBJECTIVE:   The patient appears well, alert, oriented x 3, in no distress.  BP (!) 156/84   Pulse 81   Ht 5' 5" (1.651 m)   Wt 105.2 kg (232 lb)   LMP 02/06/2023 (Exact Date) Comment: TUBAL  BMI 38.61 kg/m²   ENT normal.  Neck supple. No adenopathy or thyromegaly. DIXIE. Lungs are clear, good air entry, no wheezes, rhonchi or rales. S1 and S2 normal, no murmurs, regular rate and rhythm. Abdomen soft without tenderness, guarding, mass or organomegaly. Extremities show no edema, normal peripheral pulses. Neurological is normal, no focal findings.    BREAST EXAM: breasts appear normal, no suspicious masses, no skin or nipple changes or axillary " Call to Kiersten SPRINGER (School) regarding Loretta's discharge.  Left message if she needed to contact author.   nodes    PELVIC EXAM: VULVA: normal appearing vulva with no masses, tenderness or lesions, VAGINA: normal appearing vagina with normal color and discharge, no lesions, CERVIX: surgically absent, UTERUS: surgically absent, vaginal cuff well healed, ADNEXA: normal adnexa in size, nontender and no masses    ASSESSMENT:   well woman  History hysterectomy  Hypertension    PLAN:   mammogram  return annually or prn

## 2023-08-16 ENCOUNTER — HOSPITAL ENCOUNTER (OUTPATIENT)
Dept: RADIOLOGY | Facility: HOSPITAL | Age: 43
Discharge: HOME | End: 2023-08-16
Attending: OBSTETRICS & GYNECOLOGY
Payer: MEDICAID

## 2023-08-16 VITALS — BODY MASS INDEX: 38.65 KG/M2 | WEIGHT: 232 LBS | HEIGHT: 65 IN

## 2023-08-16 DIAGNOSIS — Z12.31 SCREENING MAMMOGRAM, ENCOUNTER FOR: ICD-10-CM

## 2023-08-16 PROCEDURE — 77067 SCR MAMMO BI INCL CAD: CPT | Mod: TC

## 2023-08-29 ENCOUNTER — TELEPHONE (OUTPATIENT)
Dept: OBSTETRICS AND GYNECOLOGY | Facility: CLINIC | Age: 43
End: 2023-08-29
Payer: MEDICAID

## 2023-08-29 RX ORDER — ESTRADIOL 0.1 MG/D
1 FILM, EXTENDED RELEASE TRANSDERMAL
Qty: 8 PATCH | Refills: 11 | Status: SHIPPED | OUTPATIENT
Start: 2023-08-31 | End: 2024-08-30

## 2023-08-29 NOTE — TELEPHONE ENCOUNTER
Pt states estradiol is not working she is having bad hot flashes at night. Pharmacy told her there are other options like patches.

## 2023-09-28 ENCOUNTER — CLINICAL SUPPORT (OUTPATIENT)
Dept: OBSTETRICS AND GYNECOLOGY | Facility: CLINIC | Age: 43
End: 2023-09-28
Payer: MEDICAID

## 2023-09-28 ENCOUNTER — TELEPHONE (OUTPATIENT)
Dept: OBSTETRICS AND GYNECOLOGY | Facility: CLINIC | Age: 43
End: 2023-09-28

## 2023-09-28 DIAGNOSIS — R30.0 DYSURIA: ICD-10-CM

## 2023-09-28 DIAGNOSIS — R30.0 BURNING WITH URINATION: Primary | ICD-10-CM

## 2023-09-28 PROCEDURE — 81002 URINALYSIS NONAUTO W/O SCOPE: CPT | Mod: PBBFAC

## 2023-09-28 PROCEDURE — 81000 URINALYSIS NONAUTO W/SCOPE: CPT | Performed by: OBSTETRICS & GYNECOLOGY

## 2023-09-28 PROCEDURE — 99999PBSHW PR PBB SHADOW TECHNICAL ONLY FILED TO HB: ICD-10-PCS | Mod: PBBFAC,,,

## 2023-09-28 PROCEDURE — 99999PBSHW PR PBB SHADOW TECHNICAL ONLY FILED TO HB: Mod: PBBFAC,,,

## 2023-09-28 PROCEDURE — 87106 FUNGI IDENTIFICATION YEAST: CPT | Performed by: OBSTETRICS & GYNECOLOGY

## 2023-09-28 PROCEDURE — 87088 URINE BACTERIA CULTURE: CPT | Performed by: OBSTETRICS & GYNECOLOGY

## 2023-09-28 PROCEDURE — 87086 URINE CULTURE/COLONY COUNT: CPT | Performed by: OBSTETRICS & GYNECOLOGY

## 2023-09-28 RX ORDER — NITROFURANTOIN 25; 75 MG/1; MG/1
100 CAPSULE ORAL 2 TIMES DAILY
Qty: 14 CAPSULE | Refills: 0 | Status: SHIPPED | OUTPATIENT
Start: 2023-09-28 | End: 2023-10-05

## 2023-09-28 NOTE — PROGRESS NOTES
Pt c/o burning with urination. Urine collected and stored in refrigerator until it can be transported to lab at [facility].

## 2023-09-28 NOTE — TELEPHONE ENCOUNTER
Pt took a UTI test at home because she is having some burning when urinating states test came back positive for UTI. Wants to know if medication can be called in or she needs to give us a urine sample.

## 2023-09-29 LAB
BACTERIA #/AREA URNS HPF: ABNORMAL /HPF
BILIRUB UR QL STRIP: NEGATIVE
CLARITY UR: ABNORMAL
COLOR UR: YELLOW
GLUCOSE UR QL STRIP: NEGATIVE
HGB UR QL STRIP: ABNORMAL
HYALINE CASTS #/AREA URNS LPF: 7.3 /LPF
KETONES UR QL STRIP: NEGATIVE
LEUKOCYTE ESTERASE UR QL STRIP: ABNORMAL
MICROSCOPIC COMMENT: ABNORMAL
NITRITE UR QL STRIP: NEGATIVE
PH UR STRIP: 7 [PH] (ref 5–8)
PROT UR QL STRIP: NEGATIVE
RBC #/AREA URNS HPF: 4 /HPF (ref 0–4)
SP GR UR STRIP: 1.02 (ref 1–1.03)
SQUAMOUS #/AREA URNS HPF: 22 /HPF
URN SPEC COLLECT METH UR: ABNORMAL
UROBILINOGEN UR STRIP-ACNC: NEGATIVE EU/DL
WBC #/AREA URNS HPF: 41 /HPF (ref 0–5)
YEAST URNS QL MICRO: ABNORMAL

## 2023-10-01 LAB — BACTERIA UR CULT: ABNORMAL

## 2023-10-02 RX ORDER — FLUCONAZOLE 100 MG/1
100 TABLET ORAL DAILY
Qty: 7 TABLET | Refills: 1 | Status: SHIPPED | OUTPATIENT
Start: 2023-10-02 | End: 2023-10-09

## 2024-08-14 ENCOUNTER — OFFICE VISIT (OUTPATIENT)
Dept: OBSTETRICS AND GYNECOLOGY | Facility: CLINIC | Age: 44
End: 2024-08-14
Payer: MEDICAID

## 2024-08-14 VITALS
HEIGHT: 65 IN | HEART RATE: 89 BPM | DIASTOLIC BLOOD PRESSURE: 62 MMHG | WEIGHT: 237 LBS | BODY MASS INDEX: 39.49 KG/M2 | SYSTOLIC BLOOD PRESSURE: 115 MMHG

## 2024-08-14 DIAGNOSIS — N95.1 MENOPAUSAL SYMPTOMS: ICD-10-CM

## 2024-08-14 DIAGNOSIS — Z01.419 ENCOUNTER FOR ANNUAL ROUTINE GYNECOLOGICAL EXAMINATION: Primary | ICD-10-CM

## 2024-08-14 DIAGNOSIS — Z90.710 HISTORY OF HYSTERECTOMY: ICD-10-CM

## 2024-08-14 DIAGNOSIS — Z12.31 SCREENING MAMMOGRAM, ENCOUNTER FOR: ICD-10-CM

## 2024-08-14 PROCEDURE — 1160F RVW MEDS BY RX/DR IN RCRD: CPT | Mod: CPTII,,, | Performed by: OBSTETRICS & GYNECOLOGY

## 2024-08-14 PROCEDURE — 3074F SYST BP LT 130 MM HG: CPT | Mod: CPTII,,, | Performed by: OBSTETRICS & GYNECOLOGY

## 2024-08-14 PROCEDURE — 3008F BODY MASS INDEX DOCD: CPT | Mod: CPTII,,, | Performed by: OBSTETRICS & GYNECOLOGY

## 2024-08-14 PROCEDURE — 1159F MED LIST DOCD IN RCRD: CPT | Mod: CPTII,,, | Performed by: OBSTETRICS & GYNECOLOGY

## 2024-08-14 PROCEDURE — 3078F DIAST BP <80 MM HG: CPT | Mod: CPTII,,, | Performed by: OBSTETRICS & GYNECOLOGY

## 2024-08-14 PROCEDURE — 99999 PR PBB SHADOW E&M-EST. PATIENT-LVL III: CPT | Mod: PBBFAC,,, | Performed by: OBSTETRICS & GYNECOLOGY

## 2024-08-14 PROCEDURE — 99213 OFFICE O/P EST LOW 20 MIN: CPT | Mod: PBBFAC | Performed by: OBSTETRICS & GYNECOLOGY

## 2024-08-14 PROCEDURE — 99396 PREV VISIT EST AGE 40-64: CPT | Mod: S$PBB,,, | Performed by: OBSTETRICS & GYNECOLOGY

## 2024-08-14 RX ORDER — METHOCARBAMOL 500 MG/1
500 TABLET, FILM COATED ORAL
COMMUNITY
Start: 2024-07-10

## 2024-08-14 RX ORDER — AMLODIPINE BESYLATE 5 MG/1
5 TABLET ORAL
COMMUNITY
Start: 2024-07-10

## 2024-08-14 RX ORDER — ESTRADIOL 0.1 MG/D
1 FILM, EXTENDED RELEASE TRANSDERMAL
Qty: 8 PATCH | Refills: 11 | Status: SHIPPED | OUTPATIENT
Start: 2024-08-15 | End: 2025-08-15

## 2024-08-14 RX ORDER — MELOXICAM 15 MG/1
15 TABLET ORAL
COMMUNITY
Start: 2024-07-10

## 2024-08-14 RX ORDER — ONDANSETRON 4 MG/1
TABLET, ORALLY DISINTEGRATING ORAL
COMMUNITY

## 2024-08-14 RX ORDER — ALBUTEROL SULFATE 90 UG/1
INHALANT RESPIRATORY (INHALATION)
COMMUNITY
Start: 2024-02-21

## 2024-08-14 NOTE — PROGRESS NOTES
"SUBJECTIVE:   43 y.o. female for annual routine wellness checkup.  Recently had carpal tunnel surgery on 1 wrist and is anxious to schedule the other one.  Current Outpatient Medications   Medication Sig Dispense Refill    albuterol (PROVENTIL/VENTOLIN HFA) 90 mcg/actuation inhaler SMARTSI Puff(s) Via Inhaler Daily PRN      amLODIPine (NORVASC) 5 MG tablet Take 5 mg by mouth.      hydroCHLOROthiazide (HYDRODIURIL) 25 MG tablet 12.5 mg every morning.      meloxicam (MOBIC) 15 MG tablet Take 15 mg by mouth.      methocarbamoL (ROBAXIN) 500 MG Tab Take 500 mg by mouth.      naproxen (NAPROSYN) 500 MG tablet Take 1 tablet (500 mg total) by mouth 2 (two) times daily. 60 tablet 1    omeprazole (PRILOSEC) 20 MG capsule 1 capsule 30 minutes before morning meal      ondansetron (ZOFRAN-ODT) 4 MG TbDL 1 tablet on the tongue and allow to dissolve Orally Once a day for 3 days      [START ON 8/15/2024] estradioL (VIVELLE-DOT) 0.1 mg/24 hr PTSW Place 1 patch onto the skin twice a week. 8 patch 11     No current facility-administered medications for this visit.     Allergies: Patient has no known allergies.   Patient's last menstrual period was 2023 (exact date).    ROS:  Feeling well. No dyspnea or chest pain on exertion.  No abdominal pain, change in bowel habits, black or bloody stools.  No urinary tract symptoms. GYN ROS: no breast pain or new or enlarging lumps on self exam, no vaginal bleeding, no discharge or pelvic pain, no hot flashes. No neurological complaints.    OBJECTIVE:   The patient appears well, alert, oriented x 3, in no distress.  /62   Pulse 89   Ht 5' 5" (1.651 m)   Wt 107.5 kg (237 lb)   LMP 2023 (Exact Date) Comment: TUBAL  BMI 39.44 kg/m²   ENT normal.  Neck supple. No adenopathy or thyromegaly. DIXIE. Lungs are clear, good air entry, no wheezes, rhonchi or rales. S1 and S2 normal, no murmurs, regular rate and rhythm. Abdomen soft without tenderness, guarding, mass or " organomegaly. Extremities show no edema, normal peripheral pulses. Neurological is normal, no focal findings.    BREAST EXAM: breasts appear normal, no suspicious masses, no skin or nipple changes or axillary nodes    PELVIC EXAM: VULVA: normal appearing vulva with no masses, tenderness or lesions, VAGINA: normal appearing vagina with normal color and discharge, no lesions, CERVIX: surgically absent, UTERUS: surgically absent, vaginal cuff well healed, ADNEXA: no masses    ASSESSMENT:   well woman  no contraindication to continue hormonal therapy  History of hysterectomy    PLAN:   mammogram  return annually or prn

## 2024-08-23 ENCOUNTER — HOSPITAL ENCOUNTER (OUTPATIENT)
Dept: RADIOLOGY | Facility: HOSPITAL | Age: 44
Discharge: HOME | End: 2024-08-23
Attending: OBSTETRICS & GYNECOLOGY
Payer: MEDICAID

## 2024-08-23 VITALS — BODY MASS INDEX: 39.65 KG/M2 | WEIGHT: 238 LBS | HEIGHT: 65 IN

## 2024-08-23 DIAGNOSIS — Z12.31 SCREENING MAMMOGRAM, ENCOUNTER FOR: ICD-10-CM

## 2024-08-23 PROCEDURE — 77067 SCR MAMMO BI INCL CAD: CPT | Mod: TC

## 2024-08-23 PROCEDURE — 77063 BREAST TOMOSYNTHESIS BI: CPT | Mod: TC

## 2024-11-20 ENCOUNTER — HOSPITAL ENCOUNTER (EMERGENCY)
Facility: HOSPITAL | Age: 44
Discharge: HOME | End: 2024-11-20
Attending: EMERGENCY MEDICINE
Payer: MEDICAID

## 2024-11-20 VITALS
BODY MASS INDEX: 39.99 KG/M2 | HEIGHT: 65 IN | SYSTOLIC BLOOD PRESSURE: 125 MMHG | OXYGEN SATURATION: 100 % | WEIGHT: 240 LBS | RESPIRATION RATE: 16 BRPM | HEART RATE: 70 BPM | DIASTOLIC BLOOD PRESSURE: 88 MMHG | TEMPERATURE: 99 F

## 2024-11-20 DIAGNOSIS — N64.4 BREAST PAIN, LEFT: Primary | ICD-10-CM

## 2024-11-20 PROCEDURE — 96372 THER/PROPH/DIAG INJ SC/IM: CPT | Performed by: EMERGENCY MEDICINE

## 2024-11-20 PROCEDURE — 63600175 PHARM REV CODE 636 W HCPCS: Performed by: EMERGENCY MEDICINE

## 2024-11-20 PROCEDURE — 99284 EMERGENCY DEPT VISIT MOD MDM: CPT | Mod: 25

## 2024-11-20 RX ORDER — KETOROLAC TROMETHAMINE 30 MG/ML
60 INJECTION, SOLUTION INTRAMUSCULAR; INTRAVENOUS
Status: COMPLETED | OUTPATIENT
Start: 2024-11-20 | End: 2024-11-20

## 2024-11-20 RX ADMIN — KETOROLAC TROMETHAMINE 60 MG: 30 INJECTION, SOLUTION INTRAMUSCULAR at 08:11

## 2024-11-20 NOTE — ED NOTES
AAOx4, skin W/D/P, cap refill<3 sec, MAEW, NAD, gait steady ,  no assistance needed upon discharge. Wheelchair and assistance offered, patient declined.

## 2024-11-20 NOTE — ED PROVIDER NOTES
Encounter Date: 11/20/2024       History     Chief Complaint   Patient presents with    Breast Pain     Pt stated that she has been experiencing left breast pain radiating to her back for the past couple days. Denied any palpable masses/abscess / reddening.      44-year-old female complaining of left breast pain for the past few days, seeing her gyn on Friday.  Recent mammogram revealed fibroglandular disease.  No fever.  No color change of skin, no nipple discharge.  No other issues.  No trauma.  Worse with movement of her left arm.  Patient has stopped her hormone replacement therapy on her own a while back.  Vital signs are stable, afebrile, alert and oriented x4, GCS is 15      Review of patient's allergies indicates:  No Known Allergies  Past Medical History:   Diagnosis Date    Breast mass, right     Dysmenorrhea, unspecified 03/01/2023    Hypertension     Irregular menstruation, unspecified 03/01/2023    Teeth problem 03/01/2023    BRACES    Thyroid disease      Past Surgical History:   Procedure Laterality Date    BREAST CYST EXCISION Right     CARPAL TUNNEL RELEASE Left     HYSTERECTOMY, TOTAL, LAPAROSCOPIC, WITH SALPINGO-OOPHORECTOMY Bilateral 03/06/2023    Procedure: HYSTERECTOMY,TOTAL,LAPAROSCOPIC,WITH SALPINGO-OOPHORECTOMY;  Surgeon: Nimo Haddad MD;  [facility];  Service: OB/GYN;  Laterality: Bilateral;  2nd - 0600    TUBAL LIGATION       Family History   Problem Relation Name Age of Onset    Hypertension Mother      Heart disease Father  55    No Known Problems Sister      No Known Problems Sister      No Known Problems Sister      Hypertension Sister      Diabetes Sister      No Known Problems Daughter      No Known Problems Maternal Aunt      No Known Problems Maternal Uncle      No Known Problems Paternal Aunt      No Known Problems Paternal Uncle      No Known Problems Maternal Grandmother      No Known Problems Maternal Grandfather      No Known Problems Paternal Grandmother      No  Known Problems Paternal Grandfather      No Known Problems Brother      No Known Problems Brother      No Known Problems Brother      No Known Problems Brother      No Known Problems Other      Breast cancer Neg Hx      Ovarian cancer Neg Hx      BRCA 1/2 Neg Hx       Social History     Tobacco Use    Smoking status: Never     Passive exposure: Never    Smokeless tobacco: Never   Substance Use Topics    Alcohol use: No     Alcohol/week: 0.0 standard drinks of alcohol    Drug use: Never     Review of Systems   Constitutional:  Negative for fever.   HENT:  Negative for sore throat.    Respiratory:  Negative for shortness of breath.    Cardiovascular:  Negative for chest pain.   Gastrointestinal:  Negative for nausea.   Genitourinary:  Negative for dysuria.   Musculoskeletal:  Negative for back pain.   Skin:  Negative for rash.   Neurological:  Negative for weakness.   Hematological:  Does not bruise/bleed easily.   All other systems reviewed and are negative.      Physical Exam     Initial Vitals   BP Pulse Resp Temp SpO2   11/20/24 0840 11/20/24 0839 11/20/24 0839 11/20/24 0839 11/20/24 0839   125/88 70 16 98.7 °F (37.1 °C) 100 %      MAP       --                Physical Exam    Nursing note and vitals reviewed.  Constitutional: She appears well-developed and well-nourished. She is not diaphoretic. No distress.   HENT:   Head: Normocephalic and atraumatic.   Eyes: Conjunctivae and EOM are normal. Pupils are equal, round, and reactive to light.   Neck: Neck supple.   Normal range of motion.  Cardiovascular:  Normal rate, regular rhythm, normal heart sounds and intact distal pulses.           No murmur heard.  Pulmonary/Chest: Breath sounds normal. No respiratory distress. She has no wheezes. She has no rhonchi. She has no rales. She exhibits no tenderness.   Breast exam performed with female RN present (BRUNO Franks) - no abnormalities noted, no color change of skin, no masses, no abscess, no tenderness elicited.  No  nipple discharge   Abdominal: Abdomen is soft. Bowel sounds are normal.   Musculoskeletal:         General: No tenderness or edema. Normal range of motion.      Cervical back: Normal range of motion and neck supple.     Neurological: She is alert and oriented to person, place, and time. She has normal strength. No cranial nerve deficit. GCS score is 15. GCS eye subscore is 4. GCS verbal subscore is 5. GCS motor subscore is 6.   Skin: Skin is warm and dry. Capillary refill takes less than 2 seconds.         ED Course   Procedures  Labs Reviewed - No data to display       Imaging Results    None          Medications   ketorolac injection 60 mg (has no administration in time range)     Medical Decision Making                        Medical Decision Making:   Differential Diagnosis:   Left breast pain, fibroglandular disease             Clinical Impression:  Final diagnoses:  [N64.4] Breast pain, left (Primary)          ED Disposition Condition    Discharge Stable          ED Prescriptions    None       Follow-up Information       Follow up With Specialties Details Why Contact Info Additional Information    Nimo Haddad MD Obstetrics and Gynecology In 2 days  [address] [facility]  [address] [address]  [phone]       [facility] Emergency Department Emergency Medicine  As needed [address] [place] [address] [id number] [phone] Floor 1             Jose Cruz Armstrong MD  11/20/24 0892

## 2025-01-02 ENCOUNTER — HOSPITAL ENCOUNTER (EMERGENCY)
Facility: HOSPITAL | Age: 45
Discharge: HOME | End: 2025-01-02
Attending: EMERGENCY MEDICINE
Payer: MEDICAID

## 2025-01-02 VITALS
SYSTOLIC BLOOD PRESSURE: 98 MMHG | BODY MASS INDEX: 39.15 KG/M2 | TEMPERATURE: 99 F | DIASTOLIC BLOOD PRESSURE: 66 MMHG | WEIGHT: 235 LBS | HEIGHT: 65 IN | RESPIRATION RATE: 18 BRPM | HEART RATE: 78 BPM | OXYGEN SATURATION: 100 %

## 2025-01-02 DIAGNOSIS — R51.9 NONINTRACTABLE HEADACHE, UNSPECIFIED CHRONICITY PATTERN, UNSPECIFIED HEADACHE TYPE: Primary | ICD-10-CM

## 2025-01-02 PROCEDURE — 96372 THER/PROPH/DIAG INJ SC/IM: CPT | Performed by: EMERGENCY MEDICINE

## 2025-01-02 PROCEDURE — 99284 EMERGENCY DEPT VISIT MOD MDM: CPT | Mod: 25

## 2025-01-02 PROCEDURE — 63600175 PHARM REV CODE 636 W HCPCS: Performed by: EMERGENCY MEDICINE

## 2025-01-02 RX ORDER — KETOROLAC TROMETHAMINE 30 MG/ML
30 INJECTION, SOLUTION INTRAMUSCULAR; INTRAVENOUS
Status: COMPLETED | OUTPATIENT
Start: 2025-01-02 | End: 2025-01-02

## 2025-01-02 RX ADMIN — KETOROLAC TROMETHAMINE 30 MG: 30 INJECTION, SOLUTION INTRAMUSCULAR at 11:01

## 2025-01-02 NOTE — ED PROVIDER NOTES
Encounter Date: 1/2/2025       History     Chief Complaint   Patient presents with    Hypertension     Pt stated that she has been experiencing intermittent hypertension with headache since Sunday. Hx HTN - compliant with meds.      45 yo female with history as below is here with complaint of headache and associated elevated BP onset a few days ago. History of HTN. States compliant with meds. No meds taken for headache. No trauma. No fever. No known sick contacts.       Review of patient's allergies indicates:  No Known Allergies  Past Medical History:   Diagnosis Date    Breast mass, right     Dysmenorrhea, unspecified 03/01/2023    Hypertension     Irregular menstruation, unspecified 03/01/2023    Teeth problem 03/01/2023    BRACES    Thyroid disease      Past Surgical History:   Procedure Laterality Date    BREAST CYST EXCISION Right     CARPAL TUNNEL RELEASE Left     HYSTERECTOMY, TOTAL, LAPAROSCOPIC, WITH SALPINGO-OOPHORECTOMY Bilateral 03/06/2023    Procedure: HYSTERECTOMY,TOTAL,LAPAROSCOPIC,WITH SALPINGO-OOPHORECTOMY;  Surgeon: Nimo Haddad MD;  Location: [facility];  Service: OB/GYN;  Laterality: Bilateral;  2nd - 0600    TUBAL LIGATION       Family History   Problem Relation Name Age of Onset    Hypertension Mother      Heart disease Father  55    No Known Problems Sister      No Known Problems Sister      No Known Problems Sister      Hypertension Sister      Diabetes Sister      No Known Problems Daughter      No Known Problems Maternal Aunt      No Known Problems Maternal Uncle      No Known Problems Paternal Aunt      No Known Problems Paternal Uncle      No Known Problems Maternal Grandmother      No Known Problems Maternal Grandfather      No Known Problems Paternal Grandmother      No Known Problems Paternal Grandfather      No Known Problems Brother      No Known Problems Brother      No Known Problems Brother      No Known Problems Brother      No Known Problems Other      Breast cancer Neg Hx       Ovarian cancer Neg Hx      BRCA 1/2 Neg Hx       Social History     Tobacco Use    Smoking status: Never     Passive exposure: Never    Smokeless tobacco: Never   Substance Use Topics    Alcohol use: No     Alcohol/week: 0.0 standard drinks of alcohol    Drug use: Never     Review of Systems   Constitutional: Negative.    Respiratory: Negative.     Cardiovascular: Negative.    Gastrointestinal: Negative.    Neurological:  Positive for headaches.   All other systems reviewed and are negative.      Physical Exam     Initial Vitals [01/02/25 1029]   BP Pulse Resp Temp SpO2   (!) 118/90 96 18 98.5 °F (36.9 °C) 97 %      MAP       --         Physical Exam    Nursing note and vitals reviewed.  Constitutional: She appears well-developed and well-nourished. She is not diaphoretic. No distress.   HENT:   Head: Normocephalic and atraumatic.   Eyes: EOM are normal. Pupils are equal, round, and reactive to light.   Neck: Neck supple.   Normal range of motion.  Cardiovascular:  Normal rate, regular rhythm and intact distal pulses.     Exam reveals no gallop and no friction rub.       No murmur heard.  Pulmonary/Chest: Breath sounds normal. No respiratory distress. She has no wheezes. She has no rales.   Abdominal: Abdomen is soft. Bowel sounds are normal. She exhibits no distension. There is no abdominal tenderness. There is no rebound.   Musculoskeletal:         General: No tenderness or edema. Normal range of motion.      Cervical back: Normal range of motion and neck supple.     Neurological: She is alert and oriented to person, place, and time. She has normal strength and normal reflexes. GCS score is 15. GCS eye subscore is 4. GCS verbal subscore is 5. GCS motor subscore is 6.   Skin: Skin is warm and dry. Capillary refill takes less than 2 seconds.         ED Course   Procedures  Labs Reviewed - No data to display       Imaging Results    None          Medications   ketorolac injection 30 mg (30 mg Intramuscular Given  1/2/25 1143)     Medical Decision Making  Benign exam. Stable vitals. Headache treated with meds in department with resolution and improved BP.     Problems Addressed:  Nonintractable headache, unspecified chronicity pattern, unspecified headache type: acute illness or injury    Risk  Prescription drug management.                                      Clinical Impression:  Final diagnoses:  [R51.9] Nonintractable headache, unspecified chronicity pattern, unspecified headache type (Primary)          ED Disposition Condition    Discharge           ED Prescriptions    None       Follow-up Information       Follow up With Specialties Details Why Contact [facility], Occupational Therapy, Speech Pathology, Internal Medicine Schedule an appointment as soon as possible for a visit   [address] [place] [address]  [phone]               Yaakov Ledezma MD  01/16/25 2204

## 2025-07-23 ENCOUNTER — HOSPITAL ENCOUNTER (EMERGENCY)
Facility: HOSPITAL | Age: 45
Discharge: HOME | End: 2025-07-23
Attending: EMERGENCY MEDICINE
Payer: MEDICAID

## 2025-07-23 VITALS
DIASTOLIC BLOOD PRESSURE: 73 MMHG | RESPIRATION RATE: 16 BRPM | HEART RATE: 79 BPM | OXYGEN SATURATION: 98 % | TEMPERATURE: 99 F | HEIGHT: 65 IN | WEIGHT: 220.88 LBS | BODY MASS INDEX: 36.8 KG/M2 | SYSTOLIC BLOOD PRESSURE: 122 MMHG

## 2025-07-23 DIAGNOSIS — M25.521 ELBOW PAIN, RIGHT: Primary | ICD-10-CM

## 2025-07-23 PROCEDURE — 96372 THER/PROPH/DIAG INJ SC/IM: CPT

## 2025-07-23 PROCEDURE — 63600175 PHARM REV CODE 636 W HCPCS

## 2025-07-23 PROCEDURE — 99284 EMERGENCY DEPT VISIT MOD MDM: CPT | Mod: 25

## 2025-07-23 RX ORDER — KETOROLAC TROMETHAMINE 30 MG/ML
30 INJECTION, SOLUTION INTRAMUSCULAR; INTRAVENOUS
Status: COMPLETED | OUTPATIENT
Start: 2025-07-23 | End: 2025-07-23

## 2025-07-23 RX ORDER — CYCLOBENZAPRINE HCL 10 MG
10 TABLET ORAL 3 TIMES DAILY PRN
Qty: 15 TABLET | Refills: 0 | Status: SHIPPED | OUTPATIENT
Start: 2025-07-23 | End: 2025-07-28

## 2025-07-23 RX ORDER — DEXAMETHASONE SODIUM PHOSPHATE 4 MG/ML
12 INJECTION, SOLUTION INTRA-ARTICULAR; INTRALESIONAL; INTRAMUSCULAR; INTRAVENOUS; SOFT TISSUE
Status: COMPLETED | OUTPATIENT
Start: 2025-07-23 | End: 2025-07-23

## 2025-07-23 RX ORDER — DICLOFENAC SODIUM 50 MG/1
50 TABLET, DELAYED RELEASE ORAL 3 TIMES DAILY PRN
Qty: 15 TABLET | Refills: 0 | Status: SHIPPED | OUTPATIENT
Start: 2025-07-23

## 2025-07-23 RX ADMIN — KETOROLAC TROMETHAMINE 30 MG: 30 INJECTION, SOLUTION INTRAMUSCULAR; INTRAVENOUS at 09:07

## 2025-07-23 RX ADMIN — DEXAMETHASONE SODIUM PHOSPHATE 12 MG: 4 INJECTION, SOLUTION INTRA-ARTICULAR; INTRALESIONAL; INTRAMUSCULAR; INTRAVENOUS; SOFT TISSUE at 09:07

## 2025-07-23 NOTE — Clinical Note
"Krzysztof"Madhav Sosa was seen and treated in our emergency department on 7/23/2025.  She may return to work on 07/28/2025.       If you have any questions or concerns, please don't hesitate to call.      Oliverio iTerney, NP"

## 2025-07-23 NOTE — DISCHARGE INSTRUCTIONS
Please follow-up with either your primary provider or your orthopedic provider for further management and treatment of right elbow pain.  Use medications prescribed as directed to help with the elbow pain  Return to ER if you develop any worsening or concerning symptoms

## 2025-07-23 NOTE — ED PROVIDER NOTES
Encounter Date: 7/23/2025       History     Chief Complaint   Patient presents with    Elbow Pain     Complains of non traumatic pain to right elbow x 3 days. Took a norco 5 that was left over from her surgery but still having pain.     This note is dictated on M*Modal word recognition program.  There are word recognition mistakes and grammatical errors that are occasionally missed on review.     Krzysztof Sosa is a 44 y.o. female with a past medical history of breast mass, dysmenorrhea, hypertension, thyroid disease presents to ER today with complaints of nontraumatic right elbow pain.  Patient reports elbow has been hurting for the last 3 days.  No swelling no erythema.  Patient reports pain 7/10.  Patient reports it hurts to flex her elbow.  Denies neck pain denies shoulder pain denies any injury.  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  Reports previous carpal tunnel surgery to both upper extremities by ortho [place].      The history is provided by the patient.     Review of patient's allergies indicates:  No Known Allergies  Past Medical History:   Diagnosis Date    Breast mass, right     Dysmenorrhea, unspecified 03/01/2023    Hypertension     Irregular menstruation, unspecified 03/01/2023    Teeth problem 03/01/2023    BRACES    Thyroid disease      Past Surgical History:   Procedure Laterality Date    BREAST CYST EXCISION Right     CARPAL TUNNEL RELEASE Left     HYSTERECTOMY, TOTAL, LAPAROSCOPIC, WITH SALPINGO-OOPHORECTOMY Bilateral 03/06/2023    Procedure: HYSTERECTOMY,TOTAL,LAPAROSCOPIC,WITH SALPINGO-OOPHORECTOMY;  Surgeon: Nimo Haddad MD;  Location: [facility];  Service: OB/GYN;  Laterality: Bilateral;  2nd - 0600    TUBAL LIGATION       Family History   Problem Relation Name Age of Onset    Hypertension Mother      Heart disease Father  55    No Known Problems Sister      No Known Problems Sister      No Known Problems Sister       Hypertension Sister      Diabetes Sister      No Known Problems Daughter      No Known Problems Maternal Aunt      No Known Problems Maternal Uncle      No Known Problems Paternal Aunt      No Known Problems Paternal Uncle      No Known Problems Maternal Grandmother      No Known Problems Maternal Grandfather      No Known Problems Paternal Grandmother      No Known Problems Paternal Grandfather      No Known Problems Brother      No Known Problems Brother      No Known Problems Brother      No Known Problems Brother      No Known Problems Other      Breast cancer Neg Hx      Ovarian cancer Neg Hx      BRCA 1/2 Neg Hx       Social History[1]  Review of Systems   Musculoskeletal:  Positive for arthralgias and myalgias.   All other systems reviewed and are negative.      Physical Exam     Initial Vitals [07/23/25 0847]   BP Pulse Resp Temp SpO2   122/73 79 16 98.5 °F (36.9 °C) 98 %      MAP       --         Physical Exam    Nursing note and vitals reviewed.  Constitutional: She appears well-developed and well-nourished.   HENT:   Head: Normocephalic.   Eyes: Pupils are equal, round, and reactive to light.   Neck:   Normal range of motion.  Cardiovascular:  Normal rate.           Pulmonary/Chest: Breath sounds normal.   Abdominal: Abdomen is soft.   Musculoskeletal:         General: Tenderness present.      Cervical back: Normal range of motion.      Comments: Patient has patient range of motion to right elbow due to pain no swelling no erythema right upper extremity neurovascularly intact     Neurological: She is alert and oriented to person, place, and time. GCS score is 15. GCS eye subscore is 4. GCS verbal subscore is 5. GCS motor subscore is 6.   Skin: Capillary refill takes less than 2 seconds. No erythema.   Psychiatric: She has a normal mood and affect.         ED Course   Procedures  Labs Reviewed - No data to display       Imaging Results    None          Medications   ketorolac injection 30 mg (has no  administration in time range)   dexAMETHasone injection 12 mg (has no administration in time range)     Medical Decision Making  Differential diagnosis includes elbow sprain, gout, overuse syndrome, olecranon bursitis, right elbow pain    Patient's symptoms consistent with elbow sprain versus right elbow pain  Will treat patient with Decadron/Toradol injection while in ER to help with pain  Ace wrap applied to right elbow.  Rice protocol for muscle and bone pain discussed with patient  Will prescribe patient Flexeril/diclofenac to help control the elbow pain  Patient advised to follow-up with orthopedic provider or PCP for further treatment of right elbow pain   Vital signs stable at discharge   Right upper extremity neurovascularly intact at discharge  Patient stable at time of discharge in no acute distress.  No life-threatening illnesses were found during ER visit today.  Patient was instructed to follow-up with PCP or other recommended specialist within the next 48-72 hours.  Patient was instructed to return to ER immediately for any worsening or concerning symptoms.  All discharge instructions discussed with patient, and patient agrees to comply with discharge instructions given today.     Risk  Prescription drug management.                                          Clinical Impression:  Final diagnoses:  [M25.521] Elbow pain, right (Primary)          ED Disposition Condition    Discharge Stable          ED Prescriptions       Medication Sig Dispense Start Date End Date Auth. Provider    diclofenac (VOLTAREN) 50 MG EC tablet Take 1 tablet (50 mg total) by mouth 3 (three) times daily as needed (Pain). 15 tablet 7/23/2025 -- Oliverio Tierney NP    cyclobenzaprine (FLEXERIL) 10 MG tablet Take 1 tablet (10 mg total) by mouth 3 (three) times daily as needed for Muscle spasms. 15 tablet 7/23/2025 7/28/2025 Oliverio Tierney NP          Follow-up Information       Follow up With Specialties Details Why Contact Info     [facility], Occupational Therapy, Speech Pathology, Internal Medicine In 3 days ER follow-up visit [address] [facility] [address]  [phone]                     [1]   Social History  Tobacco Use    Smoking status: Never     Passive exposure: Never    Smokeless tobacco: Never   Substance Use Topics    Alcohol use: No     Alcohol/week: 0.0 standard drinks of alcohol    Drug use: Never        Oliverio Tierney NP  07/23/25 0904

## 2025-08-06 ENCOUNTER — HOSPITAL ENCOUNTER (EMERGENCY)
Facility: HOSPITAL | Age: 45
Discharge: HOME | End: 2025-08-06
Attending: EMERGENCY MEDICINE
Payer: MEDICAID

## 2025-08-06 VITALS
OXYGEN SATURATION: 100 % | SYSTOLIC BLOOD PRESSURE: 124 MMHG | TEMPERATURE: 99 F | HEIGHT: 65 IN | RESPIRATION RATE: 14 BRPM | BODY MASS INDEX: 36.15 KG/M2 | HEART RATE: 80 BPM | DIASTOLIC BLOOD PRESSURE: 75 MMHG | WEIGHT: 217 LBS

## 2025-08-06 DIAGNOSIS — R10.9 FLANK PAIN: Primary | ICD-10-CM

## 2025-08-06 LAB
ABSOLUTE EOSINOPHIL (OHS): 0.07 K/UL
ABSOLUTE MONOCYTE (OHS): 0.66 K/UL (ref 0.3–1)
ABSOLUTE NEUTROPHIL COUNT (OHS): 4.93 K/UL (ref 1.8–7.7)
ALBUMIN SERPL BCP-MCNC: 4 G/DL (ref 3.5–5.2)
ALP SERPL-CCNC: 92 UNIT/L (ref 40–150)
ALT SERPL W/O P-5'-P-CCNC: 32 UNIT/L (ref 10–44)
ANION GAP (OHS): 12 MMOL/L (ref 8–16)
AST SERPL-CCNC: 33 UNIT/L (ref 11–45)
BASOPHILS # BLD AUTO: 0.04 K/UL
BASOPHILS NFR BLD AUTO: 0.5 %
BILIRUB SERPL-MCNC: 0.4 MG/DL (ref 0.1–1)
BILIRUB UR QL STRIP.AUTO: NEGATIVE
BUN SERPL-MCNC: 16 MG/DL (ref 6–20)
CALCIUM SERPL-MCNC: 9 MG/DL (ref 8.7–10.5)
CHLORIDE SERPL-SCNC: 107 MMOL/L (ref 95–110)
CLARITY UR: CLEAR
CO2 SERPL-SCNC: 24 MMOL/L (ref 23–29)
COLOR UR AUTO: YELLOW
CREAT SERPL-MCNC: 0.9 MG/DL (ref 0.5–1.4)
ERYTHROCYTE [DISTWIDTH] IN BLOOD BY AUTOMATED COUNT: 12.4 % (ref 11.5–14.5)
GFR SERPLBLD CREATININE-BSD FMLA CKD-EPI: >60 ML/MIN/1.73/M2
GLUCOSE SERPL-MCNC: 73 MG/DL (ref 70–110)
GLUCOSE UR QL STRIP: NEGATIVE
HCT VFR BLD AUTO: 37.4 % (ref 37–48.5)
HGB BLD-MCNC: 11.8 GM/DL (ref 12–16)
HGB UR QL STRIP: NEGATIVE
HOLD SPECIMEN: NORMAL
IMM GRANULOCYTES # BLD AUTO: 0.02 K/UL (ref 0–0.04)
IMM GRANULOCYTES NFR BLD AUTO: 0.2 % (ref 0–0.5)
KETONES UR QL STRIP: ABNORMAL
LEUKOCYTE ESTERASE UR QL STRIP: NEGATIVE
LIPASE SERPL-CCNC: 19 U/L (ref 4–60)
LYMPHOCYTES # BLD AUTO: 2.95 K/UL (ref 1–4.8)
MCH RBC QN AUTO: 28 PG (ref 27–31)
MCHC RBC AUTO-ENTMCNC: 31.6 G/DL (ref 32–36)
MCV RBC AUTO: 89 FL (ref 82–98)
NITRITE UR QL STRIP: NEGATIVE
NUCLEATED RBC (/100WBC) (OHS): 0 /100 WBC
PH UR STRIP: 6 [PH]
PLATELET # BLD AUTO: 308 K/UL (ref 150–450)
PMV BLD AUTO: 10.7 FL (ref 9.2–12.9)
POTASSIUM SERPL-SCNC: 3.4 MMOL/L (ref 3.5–5.1)
PROT SERPL-MCNC: 7.5 GM/DL (ref 6–8.4)
PROT UR QL STRIP: NEGATIVE
RBC # BLD AUTO: 4.22 M/UL (ref 4–5.4)
RELATIVE EOSINOPHIL (OHS): 0.8 %
RELATIVE LYMPHOCYTE (OHS): 34 % (ref 18–48)
RELATIVE MONOCYTE (OHS): 7.6 % (ref 4–15)
RELATIVE NEUTROPHIL (OHS): 56.9 % (ref 38–73)
SODIUM SERPL-SCNC: 143 MMOL/L (ref 136–145)
SP GR UR STRIP: >=1.03
UROBILINOGEN UR STRIP-ACNC: 1 EU/DL
WBC # BLD AUTO: 8.67 K/UL (ref 3.9–12.7)

## 2025-08-06 PROCEDURE — 85025 COMPLETE CBC W/AUTO DIFF WBC: CPT | Performed by: CLINICAL NURSE SPECIALIST

## 2025-08-06 PROCEDURE — 99284 EMERGENCY DEPT VISIT MOD MDM: CPT | Mod: 25

## 2025-08-06 PROCEDURE — 83690 ASSAY OF LIPASE: CPT | Performed by: CLINICAL NURSE SPECIALIST

## 2025-08-06 PROCEDURE — 81003 URINALYSIS AUTO W/O SCOPE: CPT | Performed by: CLINICAL NURSE SPECIALIST

## 2025-08-06 PROCEDURE — 36415 COLL VENOUS BLD VENIPUNCTURE: CPT | Performed by: CLINICAL NURSE SPECIALIST

## 2025-08-06 PROCEDURE — 63600175 PHARM REV CODE 636 W HCPCS: Mod: JZ,TB | Performed by: CLINICAL NURSE SPECIALIST

## 2025-08-06 PROCEDURE — 82040 ASSAY OF SERUM ALBUMIN: CPT | Performed by: CLINICAL NURSE SPECIALIST

## 2025-08-06 PROCEDURE — 96372 THER/PROPH/DIAG INJ SC/IM: CPT | Performed by: CLINICAL NURSE SPECIALIST

## 2025-08-06 PROCEDURE — 25000003 PHARM REV CODE 250: Performed by: CLINICAL NURSE SPECIALIST

## 2025-08-06 RX ORDER — KETOROLAC TROMETHAMINE 30 MG/ML
30 INJECTION, SOLUTION INTRAMUSCULAR; INTRAVENOUS
Status: COMPLETED | OUTPATIENT
Start: 2025-08-06 | End: 2025-08-06

## 2025-08-06 RX ORDER — ONDANSETRON 4 MG/1
4 TABLET, ORALLY DISINTEGRATING ORAL
Status: COMPLETED | OUTPATIENT
Start: 2025-08-06 | End: 2025-08-06

## 2025-08-06 RX ADMIN — ONDANSETRON 4 MG: 4 TABLET, ORALLY DISINTEGRATING ORAL at 12:08

## 2025-08-06 RX ADMIN — KETOROLAC TROMETHAMINE 30 MG: 30 INJECTION, SOLUTION INTRAMUSCULAR; INTRAVENOUS at 12:08

## 2025-08-19 ENCOUNTER — OFFICE VISIT (OUTPATIENT)
Dept: OBSTETRICS AND GYNECOLOGY | Facility: CLINIC | Age: 45
End: 2025-08-19
Payer: MEDICAID

## 2025-08-19 VITALS
HEIGHT: 65 IN | HEART RATE: 75 BPM | BODY MASS INDEX: 36.15 KG/M2 | SYSTOLIC BLOOD PRESSURE: 121 MMHG | DIASTOLIC BLOOD PRESSURE: 83 MMHG | WEIGHT: 217 LBS

## 2025-08-19 DIAGNOSIS — Z90.710 HISTORY OF TOTAL HYSTERECTOMY WITH BILATERAL SALPINGO-OOPHORECTOMY (BSO): ICD-10-CM

## 2025-08-19 DIAGNOSIS — Z12.31 SCREENING MAMMOGRAM, ENCOUNTER FOR: ICD-10-CM

## 2025-08-19 DIAGNOSIS — Z90.722 HISTORY OF TOTAL HYSTERECTOMY WITH BILATERAL SALPINGO-OOPHORECTOMY (BSO): ICD-10-CM

## 2025-08-19 DIAGNOSIS — Z01.419 ENCOUNTER FOR ANNUAL ROUTINE GYNECOLOGICAL EXAMINATION: Primary | ICD-10-CM

## 2025-08-19 DIAGNOSIS — Z90.79 HISTORY OF TOTAL HYSTERECTOMY WITH BILATERAL SALPINGO-OOPHORECTOMY (BSO): ICD-10-CM

## 2025-08-19 PROCEDURE — 1160F RVW MEDS BY RX/DR IN RCRD: CPT | Mod: CPTII,,, | Performed by: OBSTETRICS & GYNECOLOGY

## 2025-08-19 PROCEDURE — 99396 PREV VISIT EST AGE 40-64: CPT | Mod: S$PBB,,, | Performed by: OBSTETRICS & GYNECOLOGY

## 2025-08-19 PROCEDURE — 3079F DIAST BP 80-89 MM HG: CPT | Mod: CPTII,,, | Performed by: OBSTETRICS & GYNECOLOGY

## 2025-08-19 PROCEDURE — 99213 OFFICE O/P EST LOW 20 MIN: CPT | Mod: PBBFAC | Performed by: OBSTETRICS & GYNECOLOGY

## 2025-08-19 PROCEDURE — 3074F SYST BP LT 130 MM HG: CPT | Mod: CPTII,,, | Performed by: OBSTETRICS & GYNECOLOGY

## 2025-08-19 PROCEDURE — 1159F MED LIST DOCD IN RCRD: CPT | Mod: CPTII,,, | Performed by: OBSTETRICS & GYNECOLOGY

## 2025-08-19 PROCEDURE — 3008F BODY MASS INDEX DOCD: CPT | Mod: CPTII,,, | Performed by: OBSTETRICS & GYNECOLOGY

## 2025-08-19 PROCEDURE — 99999 PR PBB SHADOW E&M-EST. PATIENT-LVL III: CPT | Mod: PBBFAC,,, | Performed by: OBSTETRICS & GYNECOLOGY

## 2025-09-03 ENCOUNTER — HOSPITAL ENCOUNTER (OUTPATIENT)
Dept: RADIOLOGY | Facility: HOSPITAL | Age: 45
Discharge: HOME | End: 2025-09-03
Attending: OBSTETRICS & GYNECOLOGY
Payer: MEDICAID

## 2025-09-03 VITALS — BODY MASS INDEX: 36.15 KG/M2 | HEIGHT: 65 IN | WEIGHT: 217 LBS

## 2025-09-03 DIAGNOSIS — Z12.31 SCREENING MAMMOGRAM, ENCOUNTER FOR: ICD-10-CM

## 2025-09-03 PROCEDURE — 77067 SCR MAMMO BI INCL CAD: CPT | Mod: TC

## (undated) DEVICE — BLANKET UPPER BODY 78.7X29.9IN

## (undated) DEVICE — LINER SUCTION 1500CC

## (undated) DEVICE — TRAY CATH FOL SIL URIMTR 16FR

## (undated) DEVICE — LINER GLOVE POWDERFREE SZ 6.5

## (undated) DEVICE — PAD SANITARY OB STERILE

## (undated) DEVICE — DRAPE LAVH SURG 109X109X100IN

## (undated) DEVICE — TROCAR ENDOPATH XCEL 11MM 10CM

## (undated) DEVICE — CLOSURE SKIN STERI STRIP 1/2X4

## (undated) DEVICE — SOL NACL IRR 1000ML BTL

## (undated) DEVICE — SUT 0 36IN COATED VICRYL VI

## (undated) DEVICE — TROCAR ENDOPATH XCEL 5X100MM

## (undated) DEVICE — ADHESIVE MASTISOL VIAL 48/BX

## (undated) DEVICE — IRRIGATOR HYDRO-SURG PLUS 5MM

## (undated) DEVICE — NDL INSUF ULTRA VERESS 120MM

## (undated) DEVICE — ADAPTER DISP HAND SWITCH

## (undated) DEVICE — SUT MONOCRYL 4-0 PS-2

## (undated) DEVICE — SCALPEL SZ 11 RETRACTABLE

## (undated) DEVICE — SUT MONOCRYL 4-0 UD P-3 18

## (undated) DEVICE — HOOK DISSECTING 5MM

## (undated) DEVICE — ENDOSTITCH INSTRUMENT

## (undated) DEVICE — RELOAD V-LOC 180 0 8IN/20CM

## (undated) DEVICE — OCCLUDER COLPO-PNEUMO STERILE

## (undated) DEVICE — TRAY MAJOR LAPAROTOMY SURG I

## (undated) DEVICE — JELLY LUBRICATING STERILE 2OZ

## (undated) DEVICE — SPONGE LAP 18X18 PREWASHED

## (undated) DEVICE — UNDERPAD DISPOSABLE 30X30IN

## (undated) DEVICE — SUT CTD VICRYL VIL BR CT-2

## (undated) DEVICE — APPLICATOR CHLORAPREP ORN 26ML

## (undated) DEVICE — STRAP KNEE & BODY DISP 4X34IN

## (undated) DEVICE — ELECTRODE REM PLYHSV RETURN 9

## (undated) DEVICE — ENDO GRASPER ETHICON 5DSG

## (undated) DEVICE — SEALER LIGASURE LAP 37CM 5MM

## (undated) DEVICE — SCISSOR 5MMX35CM DIRECT DRIVE

## (undated) DEVICE — SYR 50CC LL

## (undated) DEVICE — DRAPE UNDERBUTTOCKS PCH STRL

## (undated) DEVICE — TIP RUMI BLUE DISPOSABLE 5/BX

## (undated) DEVICE — SET PNEUMOCLEAR HEAT HUM SE HF

## (undated) DEVICE — PAD PREP CUFFED NS 24X48IN

## (undated) DEVICE — COVER OVERHEAD SURG LT BLUE

## (undated) DEVICE — DRESSING PRIMAPORE 4X3 1/8IN

## (undated) DEVICE — SYS SEE SHARP SCOPE ANTIFOG

## (undated) DEVICE — SYR 10CC LUER LOCK

## (undated) DEVICE — DRESSING PRIMAPORE IV 2X3IN